# Patient Record
Sex: FEMALE | Race: WHITE | NOT HISPANIC OR LATINO | Employment: OTHER | URBAN - METROPOLITAN AREA
[De-identification: names, ages, dates, MRNs, and addresses within clinical notes are randomized per-mention and may not be internally consistent; named-entity substitution may affect disease eponyms.]

---

## 2017-03-14 DIAGNOSIS — I35.0 AORTIC VALVE STENOSIS, UNSPECIFIED ETIOLOGY: Primary | ICD-10-CM

## 2017-03-14 DIAGNOSIS — Z00.6 EXAMINATION OF PARTICIPANT IN CLINICAL TRIAL: ICD-10-CM

## 2017-04-18 ENCOUNTER — HOSPITAL ENCOUNTER (OUTPATIENT)
Dept: RADIOLOGY | Facility: HOSPITAL | Age: 82
Discharge: HOME | End: 2017-04-18
Attending: INTERNAL MEDICINE
Payer: MEDICARE

## 2017-04-18 ENCOUNTER — RESEARCH ENCOUNTER (OUTPATIENT)
Dept: RESEARCH | Facility: HOSPITAL | Age: 82
End: 2017-04-18

## 2017-04-18 ENCOUNTER — OFFICE VISIT (OUTPATIENT)
Dept: CARDIOLOGY | Facility: CLINIC | Age: 82
End: 2017-04-18
Payer: MEDICARE

## 2017-04-18 ENCOUNTER — HOSPITAL ENCOUNTER (OUTPATIENT)
Dept: CARDIOLOGY | Facility: CLINIC | Age: 82
Discharge: HOME | End: 2017-04-18
Payer: MEDICARE

## 2017-04-18 VITALS
HEIGHT: 59 IN | DIASTOLIC BLOOD PRESSURE: 59 MMHG | HEART RATE: 52 BPM | OXYGEN SATURATION: 98 % | SYSTOLIC BLOOD PRESSURE: 135 MMHG | BODY MASS INDEX: 27.29 KG/M2 | WEIGHT: 135.38 LBS

## 2017-04-18 DIAGNOSIS — I35.0 NONRHEUMATIC AORTIC VALVE STENOSIS: Primary | ICD-10-CM

## 2017-04-18 DIAGNOSIS — I50.32 CHRONIC DIASTOLIC HEART FAILURE: ICD-10-CM

## 2017-04-18 DIAGNOSIS — Z95.2 S/P TAVR (TRANSCATHETER AORTIC VALVE REPLACEMENT): ICD-10-CM

## 2017-04-18 DIAGNOSIS — I35.0 AORTIC VALVE STENOSIS, UNSPECIFIED ETIOLOGY: ICD-10-CM

## 2017-04-18 DIAGNOSIS — Z00.6 EXAMINATION OF PARTICIPANT IN CLINICAL TRIAL: ICD-10-CM

## 2017-04-18 DIAGNOSIS — I25.10 CORONARY ARTERY DISEASE INVOLVING NATIVE CORONARY ARTERY OF NATIVE HEART WITHOUT ANGINA PECTORIS: ICD-10-CM

## 2017-04-18 DIAGNOSIS — I10 ESSENTIAL HYPERTENSION: ICD-10-CM

## 2017-04-18 DIAGNOSIS — I65.23 BILATERAL CAROTID ARTERY STENOSIS: ICD-10-CM

## 2017-04-18 DIAGNOSIS — E78.00 HYPERCHOLESTEROLEMIA: ICD-10-CM

## 2017-04-18 LAB
AORTIC VALVE REGURGITATION: ABNORMAL
DIASTOLIC DYSFUNCTION: YES
ESTIMATED PA SYSTOLIC PRESSURE: 34.01
MITRAL VALVE REGURGITATION: ABNORMAL
RETIRED EF AND QEF - SEE NOTES: 65 (ref 55–65)
TRICUSPID VALVE REGURGITATION: ABNORMAL

## 2017-04-18 PROCEDURE — 99999 PR PBB SHADOW E&M-EST. PATIENT-LVL IV: CPT | Mod: PBBFAC,,, | Performed by: PHYSICIAN ASSISTANT

## 2017-04-18 PROCEDURE — 1126F AMNT PAIN NOTED NONE PRSNT: CPT | Mod: S$GLB,,, | Performed by: PHYSICIAN ASSISTANT

## 2017-04-18 PROCEDURE — 71020 XR CHEST PA AND LATERAL: CPT | Mod: 26,Q1,, | Performed by: RADIOLOGY

## 2017-04-18 PROCEDURE — 3078F DIAST BP <80 MM HG: CPT | Mod: S$GLB,,, | Performed by: PHYSICIAN ASSISTANT

## 2017-04-18 PROCEDURE — 1157F ADVNC CARE PLAN IN RCRD: CPT | Mod: S$GLB,,, | Performed by: PHYSICIAN ASSISTANT

## 2017-04-18 PROCEDURE — 93306 TTE W/DOPPLER COMPLETE: CPT | Mod: PBBFAC | Performed by: INTERNAL MEDICINE

## 2017-04-18 PROCEDURE — 1159F MED LIST DOCD IN RCRD: CPT | Mod: S$GLB,,, | Performed by: PHYSICIAN ASSISTANT

## 2017-04-18 PROCEDURE — 99214 OFFICE O/P EST MOD 30 MIN: CPT | Mod: S$GLB,,, | Performed by: PHYSICIAN ASSISTANT

## 2017-04-18 PROCEDURE — 3075F SYST BP GE 130 - 139MM HG: CPT | Mod: S$GLB,,, | Performed by: PHYSICIAN ASSISTANT

## 2017-04-18 PROCEDURE — 1160F RVW MEDS BY RX/DR IN RCRD: CPT | Mod: S$GLB,,, | Performed by: PHYSICIAN ASSISTANT

## 2017-04-18 PROCEDURE — 71020 XR CHEST PA AND LATERAL: CPT | Mod: TC

## 2017-04-18 RX ORDER — ROSUVASTATIN CALCIUM 10 MG/1
10 TABLET, COATED ORAL DAILY
COMMUNITY

## 2017-04-18 RX ORDER — PROPAFENONE HYDROCHLORIDE 150 MG/1
150 TABLET, COATED ORAL 2 TIMES DAILY
COMMUNITY

## 2017-04-18 NOTE — PROGRESS NOTES
"Subjective:    Patient ID:  Loreta M Damico is a 80 y.o. female who presents for follow-up of TAVR    HPI  Ms Damico presents today for 2 year f/u s/p 20mm Chris S3 TAVR via TF access. She denies CP, PND, orthopnea, or LE edema. She continues to be limited mostly by dizziness. She has seen Dr. Jon and now has a loop recorder implanted, but, per her daughter, she has not had any significant arrhythmias. She continues to follow with Dr. Kelly as well.     NYHA Class II, CCS Class I    Review of Systems   Constitution: Negative for chills, diaphoresis, fever, weakness, weight gain and weight loss.   HENT: Negative for headaches and sore throat.    Eyes: Negative for blurred vision, left eye, right eye and visual disturbance.   Cardiovascular: Negative for chest pain, claudication, dyspnea on exertion, leg swelling, near-syncope, orthopnea, palpitations, paroxysmal nocturnal dyspnea and syncope.   Respiratory: Negative for cough, hemoptysis, shortness of breath, sputum production and wheezing.    Endocrine: Negative for cold intolerance and heat intolerance.   Hematologic/Lymphatic: Negative for adenopathy. Does not bruise/bleed easily.   Skin: Negative for rash.   Musculoskeletal: Negative for falls, muscle weakness and myalgias.   Gastrointestinal: Negative for abdominal pain, change in bowel habit, constipation, diarrhea, melena and nausea.   Genitourinary: Negative for bladder incontinence.   Neurological: Negative for dizziness, focal weakness, light-headedness and numbness.   Psychiatric/Behavioral: Negative for altered mental status.         Vitals:    04/18/17 0956 04/18/17 0958   BP: (!) 140/59 (!) 135/59   BP Location: Right arm Left arm   Patient Position: Sitting Sitting   BP Method: Automatic Automatic   Pulse: (!) 52 (!) 52   SpO2: 98%    Weight: 61.4 kg (135 lb 5.8 oz)    Height: 4' 11" (1.499 m)    Body mass index is 27.34 kg/(m^2).    Objective:    Physical Exam   Constitutional: She is " oriented to person, place, and time. She appears well-developed and well-nourished. No distress.   HENT:   Head: Normocephalic and atraumatic.   Mouth/Throat: Oropharynx is clear and moist.   Eyes: Conjunctivae and EOM are normal. Pupils are equal, round, and reactive to light. No scleral icterus.   Neck: Neck supple. No JVD present. No tracheal deviation present.   Cardiovascular: Normal rate and regular rhythm.  Exam reveals no gallop and no friction rub.    No murmur heard.  Pulmonary/Chest: Effort normal and breath sounds normal. No respiratory distress. She has no wheezes. She has no rales. She exhibits no tenderness.   Abdominal: Soft. Bowel sounds are normal. She exhibits no distension. There is no hepatosplenomegaly. There is no tenderness.   Musculoskeletal: She exhibits no edema or tenderness.   Neurological: She is alert and oriented to person, place, and time.   Skin: Skin is warm and dry. No rash noted. No erythema.   Psychiatric: She has a normal mood and affect. Her behavior is normal.         Assessment:       1. Aortic stenosis - s/p 20mm Chris S3 TAVR via TF access. Doing well. On ASA and Plavix.    2. Carotid artery stenosis, bilateral - asymptomatic. JR 60-79%, LICA non-obstructive   3. Coronary artery disease involving native coronary artery without angina pectoris - s/p CABGx2 (10y ago), s/p ostial RCA POBA on 4/2/15, patent HENSLEY to LAD,  of SVG to RCA, LI in LCx   4. Hypercholesterolemia - stable, on statin   5. Essential hypertension - controlled   6. Examination of participant in clinical trial    7. S/P TAVR (transcatheter aortic valve replacement)    8. Chronic diastolic heart failure - well compensated clinically at this time.      Plan:     F/U in 1 year per PARTNER protocol  Continue ASA indefinitely.   SBEP for life                    ROS  Physical Exam

## 2017-04-18 NOTE — PROGRESS NOTES
Study: Partner II S3U  Sponsor: [employer]  Follow-up Visit:  2 Year  Date of Visit: April 18, 2017    Patient wishes to continue in study: Yes  All study protocol required CRFs completed: Yes    The subject is here for the 2 year follow up visit and accompanied by her daughter.     The subject reports two Adverse Events since her 1 Year Follow Up visit:  In June 2016, she fell during an episode of vertigo but she did not sustain any injuries.    In January 2017, she fell and struck her head due to vertigo and sustained a laceration that required 3 stitches.  She was triaged at [facility] for both incidents.        All questions were answered to her satisfaction.    She is encouraged to contact a research coordinator with any questions or concerns.     The next follow up visit related to this research trial is the 3 Year Visit.    The following tests and procedures are related to research study:  TTE, CXR, and BNP, QOL, NIHSS, mRS

## 2017-05-22 ENCOUNTER — INITIAL CONSULT (OUTPATIENT)
Dept: ELECTROPHYSIOLOGY | Facility: CLINIC | Age: 82
DRG: 641 | End: 2017-05-22
Payer: MEDICARE

## 2017-05-22 ENCOUNTER — HOSPITAL ENCOUNTER (OUTPATIENT)
Dept: CARDIOLOGY | Facility: CLINIC | Age: 82
Discharge: HOME | DRG: 641 | End: 2017-05-22
Payer: MEDICARE

## 2017-05-22 VITALS
SYSTOLIC BLOOD PRESSURE: 116 MMHG | HEART RATE: 127 BPM | WEIGHT: 133.81 LBS | DIASTOLIC BLOOD PRESSURE: 78 MMHG | BODY MASS INDEX: 26.98 KG/M2 | HEIGHT: 59 IN

## 2017-05-22 DIAGNOSIS — I25.10 CORONARY ARTERY DISEASE INVOLVING NATIVE CORONARY ARTERY WITHOUT ANGINA PECTORIS, UNSPECIFIED WHETHER NATIVE OR TRANSPLANTED HEART: ICD-10-CM

## 2017-05-22 DIAGNOSIS — I35.0 NONRHEUMATIC AORTIC VALVE STENOSIS: ICD-10-CM

## 2017-05-22 DIAGNOSIS — I10 ESSENTIAL HYPERTENSION: Primary | ICD-10-CM

## 2017-05-22 DIAGNOSIS — I49.5 SICK SINUS SYNDROME: Primary | ICD-10-CM

## 2017-05-22 DIAGNOSIS — I49.5 SICK SINUS SYNDROME: ICD-10-CM

## 2017-05-22 DIAGNOSIS — Z95.2 S/P TAVR (TRANSCATHETER AORTIC VALVE REPLACEMENT): ICD-10-CM

## 2017-05-22 DIAGNOSIS — E78.00 HYPERCHOLESTEROLEMIA: ICD-10-CM

## 2017-05-22 DIAGNOSIS — I47.19 ATRIAL TACHYCARDIA: ICD-10-CM

## 2017-05-22 PROCEDURE — 3074F SYST BP LT 130 MM HG: CPT | Mod: S$GLB,,, | Performed by: INTERNAL MEDICINE

## 2017-05-22 PROCEDURE — 1160F RVW MEDS BY RX/DR IN RCRD: CPT | Mod: S$GLB,,, | Performed by: INTERNAL MEDICINE

## 2017-05-22 PROCEDURE — 1126F AMNT PAIN NOTED NONE PRSNT: CPT | Mod: S$GLB,,, | Performed by: INTERNAL MEDICINE

## 2017-05-22 PROCEDURE — 99205 OFFICE O/P NEW HI 60 MIN: CPT | Mod: S$GLB,,, | Performed by: INTERNAL MEDICINE

## 2017-05-22 PROCEDURE — 93000 ELECTROCARDIOGRAM COMPLETE: CPT | Mod: S$GLB,,, | Performed by: INTERNAL MEDICINE

## 2017-05-22 PROCEDURE — 99999 PR PBB SHADOW E&M-EST. PATIENT-LVL III: CPT | Mod: PBBFAC,,, | Performed by: INTERNAL MEDICINE

## 2017-05-22 PROCEDURE — 1157F ADVNC CARE PLAN IN RCRD: CPT | Mod: S$GLB,,, | Performed by: INTERNAL MEDICINE

## 2017-05-22 PROCEDURE — 3078F DIAST BP <80 MM HG: CPT | Mod: S$GLB,,, | Performed by: INTERNAL MEDICINE

## 2017-05-22 PROCEDURE — 1159F MED LIST DOCD IN RCRD: CPT | Mod: S$GLB,,, | Performed by: INTERNAL MEDICINE

## 2017-05-22 NOTE — PROGRESS NOTES
IMPLANTABLE DEVICE EDUCATION CHECKLIST    5/23/17 @ 6 AM  REPORT TO CARDIOLOGY WAITING ROOM ON 3RD FLOOR OF HOSPITAL (DO NOT REPORT TO CLINIC)  Directions for Reporting to Cardiology Waiting Area in the Hospital  If you park in the Parking Garage:  Take elevators to the 2nd floor  Walk up ramp and turn right by Gold Elevators  Take elevator to the 3rd floor  Upon exiting the elevator, turn away from the clinic areas  Walk long borja around to front of hospital to area with windows overlooking [facility]  Check in at Reception Desk  OR  If family is dropping you off:  Have them drop you off at the front of the Hospital  (Near the ER, where all the flags are hung).  Take the E elevators to the 3rd floor.  Check in at the Reception Desk in the waiting room.    WASH YOUR CHEST WITH HIBICLENS OR AN ANTIBACTERIAL SOAP (SUCH AS DIAL) ON THE NIGHT BEFORE AND THE MORNING OF YOUR PROCEDURE.    DO NOT EAT OR DRINK ANYTHING AFTER: 12 MN ON THE NIGHT BEFORE YOUR PROCEDURE    MEDICATIONS  Hold lasix on the morning of procedure.   YOU MAY TAKE OTHER USUAL MORNING MEDICATIONS WITH A SIP OF WATER    YOU WILL BE SPENDING THE NIGHT AFTER YOUR PROCEDURE  YOU WILL NEED SOMEONE TO DRIVE YOU HOME THE DAY AFTER YOUR PROCEDURE    YOUR PAIN DURING YOUR PROCEDURE WILL BE MANAGED BY THE ANESTHESIA TEAM    THE ABOVE INSTRUCTIONS WERE GIVEN TO THE PATIENT VERBALLY AND THEY VERBALIZED UNDERSTANDING. THEY DO NOT REQUIRE ANY SPECIAL NEEDS AND DO NOT HAVE ANY LEARNING BARRIERS.     Any need to reschedule or cancel procedures, or any questions regarding your procedures should be addressed directly with the Arrhythmia Department Nurses at the following phone number: [phone]

## 2017-05-22 NOTE — PROGRESS NOTES
Subjective:     HPI    Cardiologist: Jesus Alberto Kelly MD     I had the pleasure of seeing Loreta M Damico in consultation at your request for the evaluation of sick sinus syndrome. She is an 81 year old female with a history of HTN, HLD, severe AS status-post TAVR, CAD status-post CABG and ostial RCA stent, carotid disease status-post stent to the R ICA, and a history of subdural hematoma following a fall roughly 5 years ago, who had an ILR implanted in 6/2016. I reviewed the 5/8/2017 interrogation, which showed multiple pause events, which were consistent with device undersensing. Additionally, episodes classified as AF were most consistent with sinus rhythm with frequent PACs.    Ms. Damico has been having more fast heart rates recently. She was scheduled to have a pacemaker implanted by Dr. Kelly, but due to scheduling issues they have decided to have this done at [facility]. She is on Rythmol  mg bid.    Other recent cardiac studies include an echo performed in 4/2017 which showed an EF of 65%, mild MR, and well-seated TAVR with mild paravalvular AI. A 24-hour Holter monitor performed in 1/2017 showed sinus rhythm with an average HR of 60 bpm (range 47-81 bpm), with 20 episodes of nonsustained AT (longest 6 beats).     I reviewed today's ECG tracing, which shows atrial tachycardia and a ventricular rate of 127 bpm. Notably, she takes her pulse regularly, and has paroxysms this high interspersed with pulses in the 50s bpm range.    Review of Systems   Constitution: Positive for malaise/fatigue. Negative for decreased appetite, weight gain and weight loss.   HENT: Negative for sore throat.    Eyes: Negative for blurred vision.   Cardiovascular: Positive for chest pain and dyspnea on exertion. Negative for irregular heartbeat, leg swelling, near-syncope, orthopnea, palpitations, paroxysmal nocturnal dyspnea and syncope.   Respiratory: Negative for shortness of breath.    Skin: Negative for rash.    Musculoskeletal: Positive for falls. Negative for arthritis.   Gastrointestinal: Negative for abdominal pain.   Neurological: Negative for focal weakness.   Psychiatric/Behavioral: Negative for altered mental status.        Objective:    Physical Exam   Constitutional: She is oriented to person, place, and time. She appears well-developed and well-nourished. No distress.   HENT:   Head: Normocephalic and atraumatic.   Mouth/Throat: Oropharynx is clear and moist.   Eyes: Conjunctivae are normal. Pupils are equal, round, and reactive to light. No scleral icterus.   Neck: Normal range of motion. Neck supple. No JVD present. No thyromegaly present.   Cardiovascular: Regular rhythm, normal heart sounds and intact distal pulses.  Tachycardia present.  Exam reveals no gallop and no friction rub.    No murmur heard.  Pulmonary/Chest: Effort normal and breath sounds normal. No respiratory distress.   Abdominal: Soft. Bowel sounds are normal. She exhibits no distension.   Musculoskeletal: She exhibits no edema.   Neurological: She is alert and oriented to person, place, and time.   Skin: Skin is warm and dry.   Psychiatric: She has a normal mood and affect. Her behavior is normal.         Assessment:       1. Essential hypertension    2. Atrial tachycardia    3. Sick sinus syndrome    4. Coronary artery disease involving native coronary artery without angina pectoris, unspecified whether native or transplanted heart    5. Nonrheumatic aortic valve stenosis    6. Hypercholesterolemia    7. S/P TAVR (transcatheter aortic valve replacement)         Plan:   In summary, Loreta M Damico is an 81 year old female with a history of sick sinus syndrome and symptomatic atrial tachycardia. She is currently in AT at 127 bpm. Given these events are paroxysmal, there are few options for AVN up-titration. We discussed the risks, benefits, indications, and alternatives to pacemaker implantation. Following implant, a beta-blocker will be  started. Should she have recurrent or symptomatic AT down the road, then we will discuss the risks and benefits of EPS and catheter ablation.    Thank you for allowing me to participate in the care of this patient. Please do not hesitate to call me with any questions or concerns.

## 2017-05-23 ENCOUNTER — ANESTHESIA (OUTPATIENT)
Dept: MEDSURG UNIT | Facility: HOSPITAL | Age: 82
DRG: 641 | End: 2017-05-23
Payer: MEDICARE

## 2017-05-23 ENCOUNTER — ANESTHESIA EVENT (OUTPATIENT)
Dept: MEDSURG UNIT | Facility: HOSPITAL | Age: 82
DRG: 641 | End: 2017-05-23
Payer: MEDICARE

## 2017-05-23 ENCOUNTER — SURGERY (OUTPATIENT)
Age: 82
End: 2017-05-23

## 2017-05-23 DIAGNOSIS — I49.5 SSS (SICK SINUS SYNDROME): Primary | ICD-10-CM

## 2017-05-23 DIAGNOSIS — Z95.0 CARDIAC PACEMAKER IN SITU: ICD-10-CM

## 2017-05-23 DIAGNOSIS — Z95.810 AUTOMATIC IMPLANTABLE CARDIAC DEFIBRILLATOR IN SITU: ICD-10-CM

## 2017-05-23 PROCEDURE — 0JPT32Z REMOVAL OF MONITORING DEVICE FROM TRUNK SUBCUTANEOUS TISSUE AND FASCIA, PERCUTANEOUS APPROACH: ICD-10-PCS | Performed by: INTERNAL MEDICINE

## 2017-05-23 PROCEDURE — 02H63JZ INSERTION OF PACEMAKER LEAD INTO RIGHT ATRIUM, PERCUTANEOUS APPROACH: ICD-10-PCS | Performed by: INTERNAL MEDICINE

## 2017-05-23 PROCEDURE — D9220A PRA ANESTHESIA: Mod: ANES,,, | Performed by: ANESTHESIOLOGY

## 2017-05-23 PROCEDURE — 02HK3JZ INSERTION OF PACEMAKER LEAD INTO RIGHT VENTRICLE, PERCUTANEOUS APPROACH: ICD-10-PCS | Performed by: INTERNAL MEDICINE

## 2017-05-23 PROCEDURE — 0JH606Z INSERTION OF PACEMAKER, DUAL CHAMBER INTO CHEST SUBCUTANEOUS TISSUE AND FASCIA, OPEN APPROACH: ICD-10-PCS | Performed by: INTERNAL MEDICINE

## 2017-05-23 PROCEDURE — D9220A PRA ANESTHESIA: Mod: CRNA,,, | Performed by: NURSE ANESTHETIST, CERTIFIED REGISTERED

## 2017-05-23 RX ORDER — LIDOCAINE HCL/PF 100 MG/5ML
SYRINGE (ML) INTRAVENOUS
Status: DISCONTINUED | OUTPATIENT
Start: 2017-05-23 | End: 2017-05-23

## 2017-05-23 RX ORDER — CEFAZOLIN SODIUM 1 G/3ML
INJECTION, POWDER, FOR SOLUTION INTRAMUSCULAR; INTRAVENOUS
Status: DISCONTINUED | OUTPATIENT
Start: 2017-05-23 | End: 2017-05-23

## 2017-05-23 RX ORDER — IODIXANOL 320 MG/ML
INJECTION, SOLUTION INTRAVASCULAR
Status: DISCONTINUED | OUTPATIENT
Start: 2017-05-23 | End: 2017-05-23

## 2017-05-23 RX ORDER — FENTANYL CITRATE 50 UG/ML
INJECTION, SOLUTION INTRAMUSCULAR; INTRAVENOUS
Status: DISCONTINUED | OUTPATIENT
Start: 2017-05-23 | End: 2017-05-23

## 2017-05-23 RX ORDER — SODIUM CHLORIDE 9 MG/ML
INJECTION, SOLUTION INTRAVENOUS CONTINUOUS PRN
Status: DISCONTINUED | OUTPATIENT
Start: 2017-05-23 | End: 2017-05-23

## 2017-05-23 RX ORDER — PROPOFOL 10 MG/ML
VIAL (ML) INTRAVENOUS
Status: DISCONTINUED | OUTPATIENT
Start: 2017-05-23 | End: 2017-05-23

## 2017-05-23 RX ORDER — PROPOFOL 10 MG/ML
VIAL (ML) INTRAVENOUS CONTINUOUS PRN
Status: DISCONTINUED | OUTPATIENT
Start: 2017-05-23 | End: 2017-05-23

## 2017-05-23 RX ORDER — PHENYLEPHRINE HYDROCHLORIDE 10 MG/ML
INJECTION INTRAVENOUS
Status: DISCONTINUED | OUTPATIENT
Start: 2017-05-23 | End: 2017-05-23

## 2017-05-23 RX ADMIN — PHENYLEPHRINE HYDROCHLORIDE 50 MCG: 10 INJECTION INTRAVENOUS at 08:05

## 2017-05-23 RX ADMIN — IODIXANOL 10 ML: 320 INJECTION, SOLUTION INTRAVASCULAR at 08:05

## 2017-05-23 RX ADMIN — CEFAZOLIN 2 G: 1 INJECTION, POWDER, FOR SOLUTION INTRAMUSCULAR; INTRAVENOUS; PARENTERAL at 08:05

## 2017-05-23 RX ADMIN — PROPOFOL 100 MCG/KG/MIN: 10 INJECTION, EMULSION INTRAVENOUS at 07:05

## 2017-05-23 RX ADMIN — SODIUM CHLORIDE: 0.9 INJECTION, SOLUTION INTRAVENOUS at 07:05

## 2017-05-23 RX ADMIN — PROPOFOL 20 MG: 10 INJECTION, EMULSION INTRAVENOUS at 07:05

## 2017-05-23 RX ADMIN — PHENYLEPHRINE HYDROCHLORIDE 100 MCG: 10 INJECTION INTRAVENOUS at 08:05

## 2017-05-23 RX ADMIN — FENTANYL CITRATE 25 MCG: 50 INJECTION, SOLUTION INTRAMUSCULAR; INTRAVENOUS at 07:05

## 2017-05-23 RX ADMIN — SODIUM CHLORIDE, SODIUM GLUCONATE, SODIUM ACETATE, POTASSIUM CHLORIDE, MAGNESIUM CHLORIDE, SODIUM PHOSPHATE, DIBASIC, AND POTASSIUM PHOSPHATE: .53; .5; .37; .037; .03; .012; .00082 INJECTION, SOLUTION INTRAVENOUS at 07:05

## 2017-05-23 RX ADMIN — LIDOCAINE HYDROCHLORIDE 40 MG: 20 INJECTION, SOLUTION INTRAVENOUS at 07:05

## 2017-05-23 NOTE — TRANSFER OF CARE
Anesthesia Transfer of Care Note    Patient: Loreta M Damico    Procedure(s) Performed: Procedure(s) (LRB):  INSERTION-PACEMAKER-DUAL (N/A)    Patient location: PACU    Anesthesia Type: general    Transport from OR: Transported from OR on room air with adequate spontaneous ventilation    Post pain: adequate analgesia    Post assessment: no apparent anesthetic complications    Post vital signs: stable    Level of consciousness: awake    Nausea/Vomiting: no nausea/vomiting    Complications: none    Transfer of care protocol was followed      Last vitals:   Visit Vitals  /76 (BP Location: Left arm, Patient Position: Lying, BP Method: Automatic)   Pulse 106   Temp 36.4 °C (97.6 °F) (Oral)   Resp 18   Ht 5' (1.524 m)   Wt 59.9 kg (132 lb)   SpO2 96%   Breastfeeding? No   BMI 25.78 kg/m²

## 2017-05-23 NOTE — ANESTHESIA PREPROCEDURE EVALUATION
05/23/2017  Loreta M Damico is a 81 y.o., female;  Pre-operative evaluation for Procedure(s) (LRB):  INSERTION-PACEMAKER-DUAL (N/A)    Loreta M Damico is a 81 y.o. female     Patient Active Problem List   Diagnosis    Aortic stenosis    Carotid artery stenosis (JR 60-79%, LICA 16-39%)    CAD (coronary artery disease) s/p CABGx2 (10y ago) s/p multiple PCI x2 (2013 at [facility] and ~7y ago at [facility])    Hypercholesterolemia    Hypertension    recent GI bleed (1/2015) - s/p EGD negative, awaiting colonoscopy    Post PTCA    Aortic valve disorders    Chronic diastolic heart failure    Acute on chronic diastolic heart failure    Postoperative anemia due to acute blood loss    Coronary atherosclerosis of unspecified type of vessel, native or graft    Examination of participant in clinical trial    S/P TAVR (transcatheter aortic valve replacement)    Thalassemia minor    Anemia    Atrial tachycardia    Sick sinus syndrome       Review of patient's allergies indicates:   Allergen Reactions    Codeine Nausea And Vomiting       No current facility-administered medications on file prior to encounter.      Current Outpatient Prescriptions on File Prior to Encounter   Medication Sig Dispense Refill    amlodipine (NORVASC) 2.5 MG tablet Take 2.5 mg by mouth 2 (two) times daily.       aspirin (ECOTRIN) 81 MG EC tablet Take 81 mg by mouth once daily.      clopidogrel (PLAVIX) 75 mg tablet Take 1 tablet (75 mg total) by mouth once daily. 30 tablet 11    cyanocobalamin 1,000 mcg/mL injection 1,000 mcg every 30 days.       folic acid (FOLVITE) 1 MG tablet Take 1 mg by mouth once daily.       furosemide (LASIX) 40 MG tablet Take 40 mg by mouth once daily.       isosorbide mononitrate (IMDUR) 60 MG 24 hr tablet Take 60 mg by mouth once daily.       levothyroxine (SYNTHROID) 50 MCG tablet Take 50 mcg by mouth  once daily.       lisinopril 10 MG tablet Take 10 mg by mouth 2 (two) times daily.       pantoprazole (PROTONIX) 40 MG tablet Take 40 mg by mouth 2 (two) times daily.       potassium chloride (MICRO-K) 10 MEQ CpSR Take 10 mEq by mouth once daily.       PROLIA 60 mg/mL Syrg Inject 60 mg as directed every 6 (six) months.       propafenone (RHTHYMOL) 150 MG Tab Take 150 mg by mouth 2 (two) times daily.      rosuvastatin (CRESTOR) 10 MG tablet Take 10 mg by mouth once daily.      acetaminophen (TYLENOL) 500 MG tablet Take 1,000 mg by mouth as needed for Pain.         Past Surgical History:   Procedure Laterality Date    CARDIAC CATHETERIZATION      has had stents placed    CARDIAC VALVE SURGERY      CHOLECYSTECTOMY      CORONARY ANGIOPLASTY      CORONARY ARTERY BYPASS GRAFT      EYE SURGERY      HYSTERECTOMY      loop recorder placement          Social History     Social History    Marital status:      Spouse name: N/A    Number of children: N/A    Years of education: N/A     Occupational History    Not on file.     Social History Main Topics    Smoking status: Never Smoker    Smokeless tobacco: Never Used    Alcohol use No    Drug use: No    Sexual activity: Not on file     Other Topics Concern    Not on file     Social History Narrative    No narrative on file         Vital Signs Range (Last 24H):  Temp:  [36.4 °C (97.6 °F)]   Pulse:  [106-127]   Resp:  [18]   BP: (109-119)/(71-78)   SpO2:  [96 %]       CBC:   Recent Labs      17   1425   WBC  7.84   RBC  4.88   HGB  11.2*   HCT  34.0*   PLT  234   MCV  70*   MCH  23.0*   MCHC  32.9       CMP:   Recent Labs      17   1425   NA  142   K  4.2   CL  107   CO2  25   BUN  27*   CREATININE  0.8   GLU  82   CALCIUM  9.4       INR  Recent Labs      17   1425   INR  1.0           Diagnostic Studies:      EKD Echo:      ROS/MED HX  General:  Patient has a history of anesthetic complications.  Cardiovascular: She has  hypertension.  She has congestive heart failure.      Pre-op Assessment    I have reviewed the Patient Summary Reports.    I have reviewed the Nursing Notes.   I have reviewed the Medications.     Review of Systems  Anesthesia Hx:  Hx of Anesthetic complications ponv  Denies Personal Hx of Anesthesia complications.   Cardiovascular:   Hypertension CAD   CHF  Functional Capacity low / < 4 METS  Coronary Artery Disease: S/P Percutaneous Coronary Intervention (PCI)   S/P Drug Eluting Stent (DONTE), right coronary , currently on clopidogrel (Plavix). S/P Aorto-Coronary Bypass Graft Surgery (CABG):  Congestive Heart Failure (CHF)  Hypertension    Endocrine:  Thyroid Disease Hypothyroidism  Metabolic Disorders, Hyperlipoproteinemia      Physical Exam   Airway/Jaw/Neck:  Airway Findings: Mouth Opening: Normal Tongue: Normal  General Airway Assessment: Adult  Mallampati: II            Mental Status:  Mental Status Findings:  Cooperative, Alert and Oriented         Anesthesia Plan  Type of Anesthesia, risks & benefits discussed:  Anesthesia Type:  general, MAC  Patient's Preference: same  Intra-op Monitoring Plan: standard ASA monitors  Intra-op Monitoring Plan Comments:   Post Op Pain Control Plan: IV/PO Opiods PRN  Post Op Pain Control Plan Comments:   Induction:   IV  Beta Blocker:  Patient is not currently on a Beta-Blocker (No further documentation required).       Informed Consent: Patient understands risks and agrees with Anesthesia plan.  Questions answered. Anesthesia consent signed with patient.  ASA Score: 3     Day of Surgery Review of History & Physical:    H&P update referred to the surgeon.         Ready For Surgery From Anesthesia Perspective.

## 2017-05-23 NOTE — ANESTHESIA POSTPROCEDURE EVALUATION
Anesthesia Post Evaluation    Patient: Loreta M Damico    Procedure(s) Performed: Procedure(s) (LRB):  INSERTION-PACEMAKER-DUAL (N/A)    Final Anesthesia Type: general  Patient location during evaluation: Cath Lab  Patient participation: Yes- Able to Participate  Level of consciousness: awake and alert  Post-procedure vital signs: reviewed and stable  Pain management: adequate  Airway patency: patent  PONV status at discharge: No PONV  Anesthetic complications: yes  Perioperative Events: corneal abrasion    Cardiovascular status: blood pressure returned to baseline  Respiratory status: unassisted, spontaneous ventilation and room air  Hydration status: euvolemic  Follow-up not needed.        Visit Vitals  /61 (BP Location: Right arm, Patient Position: Lying, BP Method: Automatic)   Pulse 87   Temp 36.4 °C (97.6 °F) (Oral)   Resp 18   Ht 5' (1.524 m)   Wt 59.9 kg (132 lb)   SpO2 (!) 92%   Breastfeeding? No   BMI 25.78 kg/m²       Pain/Ilya Score: Pain Assessment Performed: Yes (5/23/2017 11:06 AM)  Presence of Pain: denies (5/23/2017 11:06 AM)  Pain Rating Prior to Med Admin: 1 (5/23/2017 10:30 AM)  Ilya Score: 10 (5/23/2017 10:45 AM)

## 2017-05-23 NOTE — ANESTHESIA RELEASE NOTE
Anesthesia Release from PACU Note    Patient: Loreta M Damico    Procedure(s) Performed: Procedure(s) (LRB):  INSERTION-PACEMAKER-DUAL (N/A)    Anesthesia type: general    Post pain: Adequate analgesia    Post assessment: no apparent anesthetic complications and tolerated procedure well    Last Vitals:   Visit Vitals  /61 (BP Location: Right arm, Patient Position: Lying, BP Method: Automatic)   Pulse 87   Temp 36.4 °C (97.6 °F) (Oral)   Resp 18   Ht 5' (1.524 m)   Wt 59.9 kg (132 lb)   SpO2 (!) 92%   Breastfeeding? No   BMI 25.78 kg/m²       Post vital signs: stable    Level of consciousness: awake, alert  and oriented    Nausea/Vomiting: no nausea/no vomiting    Complications: none and possible eye injury    Airway Patency: patent    Respiratory: unassisted, spontaneous ventilation, room air    Cardiovascular: stable and blood pressure at baseline    Hydration: euvolemic

## 2017-05-25 ENCOUNTER — NURSE TRIAGE (OUTPATIENT)
Dept: ADMINISTRATIVE | Facility: CLINIC | Age: 82
End: 2017-05-25

## 2017-05-25 ENCOUNTER — HOSPITAL ENCOUNTER (INPATIENT)
Facility: HOSPITAL | Age: 82
LOS: 1 days | Discharge: HOME HEALTH | DRG: 641 | End: 2017-05-26
Attending: EMERGENCY MEDICINE | Admitting: HOSPITALIST
Payer: MEDICARE

## 2017-05-25 DIAGNOSIS — I47.19 ATRIAL TACHYCARDIA: ICD-10-CM

## 2017-05-25 DIAGNOSIS — E86.0 DEHYDRATION: Primary | ICD-10-CM

## 2017-05-25 DIAGNOSIS — I47.19 ATRIAL TACHYCARDIA: Primary | ICD-10-CM

## 2017-05-25 PROBLEM — I95.9 HYPOTENSION: Status: ACTIVE | Noted: 2017-05-25

## 2017-05-25 PROBLEM — N17.9 AKI (ACUTE KIDNEY INJURY): Status: ACTIVE | Noted: 2017-05-25

## 2017-05-25 PROBLEM — I25.810 CORONARY ARTERY DISEASE INVOLVING AUTOLOGOUS VEIN BYPASS GRAFT: Status: ACTIVE | Noted: 2017-05-25

## 2017-05-25 PROBLEM — I48.91 AFIB: Status: ACTIVE | Noted: 2017-05-25

## 2017-05-25 LAB
ALBUMIN SERPL BCP-MCNC: 3.4 G/DL
ALP SERPL-CCNC: 92 U/L
ALT SERPL W/O P-5'-P-CCNC: 6 U/L
ANION GAP SERPL CALC-SCNC: 9 MMOL/L
ANISOCYTOSIS BLD QL SMEAR: SLIGHT
AST SERPL-CCNC: 22 U/L
BASOPHILS # BLD AUTO: ABNORMAL K/UL
BASOPHILS NFR BLD: 0 %
BILIRUB SERPL-MCNC: 0.5 MG/DL
BILIRUB UR QL STRIP: NEGATIVE
BNP SERPL-MCNC: 280 PG/ML
BUN SERPL-MCNC: 40 MG/DL
CALCIUM SERPL-MCNC: 8 MG/DL
CHLORIDE SERPL-SCNC: 110 MMOL/L
CLARITY UR REFRACT.AUTO: CLEAR
CO2 SERPL-SCNC: 20 MMOL/L
COLOR UR AUTO: NORMAL
CREAT SERPL-MCNC: 1.2 MG/DL
CREAT UR-MCNC: 45 MG/DL
DIFFERENTIAL METHOD: ABNORMAL
EOSINOPHIL # BLD AUTO: ABNORMAL K/UL
EOSINOPHIL NFR BLD: 0 %
EOSINOPHIL URNS QL WRIGHT STN: NORMAL
ERYTHROCYTE [DISTWIDTH] IN BLOOD BY AUTOMATED COUNT: 15.6 %
EST. GFR  (AFRICAN AMERICAN): 49.3 ML/MIN/1.73 M^2
EST. GFR  (NON AFRICAN AMERICAN): 42.8 ML/MIN/1.73 M^2
GLUCOSE SERPL-MCNC: 98 MG/DL
GLUCOSE UR QL STRIP: NEGATIVE
HCT VFR BLD AUTO: 30.8 %
HGB BLD-MCNC: 10.2 G/DL
HGB UR QL STRIP: NEGATIVE
HYPOCHROMIA BLD QL SMEAR: ABNORMAL
INR PPP: 1
KETONES UR QL STRIP: NEGATIVE
LEUKOCYTE ESTERASE UR QL STRIP: NEGATIVE
LYMPHOCYTES # BLD AUTO: ABNORMAL K/UL
LYMPHOCYTES NFR BLD: 13 %
MCH RBC QN AUTO: 23.1 PG
MCHC RBC AUTO-ENTMCNC: 33.1 %
MCV RBC AUTO: 70 FL
MONOCYTES # BLD AUTO: ABNORMAL K/UL
MONOCYTES NFR BLD: 5 %
NEUTROPHILS # BLD AUTO: ABNORMAL K/UL
NEUTROPHILS NFR BLD: 82 %
NITRITE UR QL STRIP: NEGATIVE
OVALOCYTES BLD QL SMEAR: ABNORMAL
PH UR STRIP: 6 [PH] (ref 5–8)
PLATELET # BLD AUTO: 171 K/UL
PMV BLD AUTO: ABNORMAL FL
POIKILOCYTOSIS BLD QL SMEAR: SLIGHT
POLYCHROMASIA BLD QL SMEAR: ABNORMAL
POTASSIUM SERPL-SCNC: 4.2 MMOL/L
PROT SERPL-MCNC: 6.5 G/DL
PROT UR QL STRIP: NEGATIVE
PROTHROMBIN TIME: 11.1 SEC
RBC # BLD AUTO: 4.41 M/UL
SCHISTOCYTES BLD QL SMEAR: ABNORMAL
SODIUM SERPL-SCNC: 139 MMOL/L
SODIUM UR-SCNC: 40 MMOL/L
SP GR UR STRIP: 1.01 (ref 1–1.03)
TARGETS BLD QL SMEAR: ABNORMAL
TROPONIN I SERPL DL<=0.01 NG/ML-MCNC: 0.02 NG/ML
TSH SERPL DL<=0.005 MIU/L-ACNC: 2.45 UIU/ML
URN SPEC COLLECT METH UR: NORMAL
UROBILINOGEN UR STRIP-ACNC: NEGATIVE EU/DL
UUN UR-MCNC: 625 MG/DL
WBC # BLD AUTO: 11.53 K/UL

## 2017-05-25 PROCEDURE — 11000001 HC ACUTE MED/SURG PRIVATE ROOM

## 2017-05-25 PROCEDURE — 25000003 PHARM REV CODE 250: Performed by: HOSPITALIST

## 2017-05-25 PROCEDURE — 85610 PROTHROMBIN TIME: CPT

## 2017-05-25 PROCEDURE — 83880 ASSAY OF NATRIURETIC PEPTIDE: CPT

## 2017-05-25 PROCEDURE — 99222 1ST HOSP IP/OBS MODERATE 55: CPT | Mod: ,,, | Performed by: HOSPITALIST

## 2017-05-25 PROCEDURE — 93280 PM DEVICE PROGR EVAL DUAL: CPT

## 2017-05-25 PROCEDURE — 80053 COMPREHEN METABOLIC PANEL: CPT

## 2017-05-25 PROCEDURE — 85027 COMPLETE CBC AUTOMATED: CPT

## 2017-05-25 PROCEDURE — 84443 ASSAY THYROID STIM HORMONE: CPT

## 2017-05-25 PROCEDURE — 82570 ASSAY OF URINE CREATININE: CPT

## 2017-05-25 PROCEDURE — 99285 EMERGENCY DEPT VISIT HI MDM: CPT | Mod: 25

## 2017-05-25 PROCEDURE — 87205 SMEAR GRAM STAIN: CPT

## 2017-05-25 PROCEDURE — 25000003 PHARM REV CODE 250: Performed by: STUDENT IN AN ORGANIZED HEALTH CARE EDUCATION/TRAINING PROGRAM

## 2017-05-25 PROCEDURE — 84484 ASSAY OF TROPONIN QUANT: CPT

## 2017-05-25 PROCEDURE — 84300 ASSAY OF URINE SODIUM: CPT

## 2017-05-25 PROCEDURE — 96361 HYDRATE IV INFUSION ADD-ON: CPT

## 2017-05-25 PROCEDURE — 93005 ELECTROCARDIOGRAM TRACING: CPT

## 2017-05-25 PROCEDURE — 81003 URINALYSIS AUTO W/O SCOPE: CPT

## 2017-05-25 PROCEDURE — 96360 HYDRATION IV INFUSION INIT: CPT

## 2017-05-25 PROCEDURE — 25000003 PHARM REV CODE 250: Performed by: EMERGENCY MEDICINE

## 2017-05-25 PROCEDURE — 99285 EMERGENCY DEPT VISIT HI MDM: CPT | Mod: ,,, | Performed by: EMERGENCY MEDICINE

## 2017-05-25 PROCEDURE — 85007 BL SMEAR W/DIFF WBC COUNT: CPT

## 2017-05-25 PROCEDURE — 93010 ELECTROCARDIOGRAM REPORT: CPT | Mod: ,,, | Performed by: INTERNAL MEDICINE

## 2017-05-25 PROCEDURE — 93280 PM DEVICE PROGR EVAL DUAL: CPT | Mod: 26,,, | Performed by: INTERNAL MEDICINE

## 2017-05-25 PROCEDURE — 84540 ASSAY OF URINE/UREA-N: CPT

## 2017-05-25 RX ORDER — CLOPIDOGREL BISULFATE 75 MG/1
75 TABLET ORAL DAILY
COMMUNITY

## 2017-05-25 RX ORDER — ACETAMINOPHEN 325 MG/1
650 TABLET ORAL EVERY 8 HOURS PRN
Status: DISCONTINUED | OUTPATIENT
Start: 2017-05-25 | End: 2017-05-26 | Stop reason: HOSPADM

## 2017-05-25 RX ORDER — RAMELTEON 8 MG/1
8 TABLET ORAL NIGHTLY PRN
Status: DISCONTINUED | OUTPATIENT
Start: 2017-05-25 | End: 2017-05-26 | Stop reason: HOSPADM

## 2017-05-25 RX ORDER — METOPROLOL TARTRATE 25 MG/1
25 TABLET, FILM COATED ORAL
Status: COMPLETED | OUTPATIENT
Start: 2017-05-25 | End: 2017-05-25

## 2017-05-25 RX ORDER — PROPAFENONE HYDROCHLORIDE 150 MG/1
150 TABLET, COATED ORAL 2 TIMES DAILY
Status: DISCONTINUED | OUTPATIENT
Start: 2017-05-25 | End: 2017-05-26 | Stop reason: HOSPADM

## 2017-05-25 RX ORDER — GLUCAGON 1 MG
1 KIT INJECTION
Status: DISCONTINUED | OUTPATIENT
Start: 2017-05-25 | End: 2017-05-26 | Stop reason: HOSPADM

## 2017-05-25 RX ORDER — SODIUM CHLORIDE 9 MG/ML
INJECTION, SOLUTION INTRAVENOUS CONTINUOUS
Status: ACTIVE | OUTPATIENT
Start: 2017-05-25 | End: 2017-05-26

## 2017-05-25 RX ORDER — LEVOTHYROXINE SODIUM 50 UG/1
50 TABLET ORAL DAILY
Status: DISCONTINUED | OUTPATIENT
Start: 2017-05-25 | End: 2017-05-26 | Stop reason: HOSPADM

## 2017-05-25 RX ORDER — IBUPROFEN 200 MG
16 TABLET ORAL
Status: DISCONTINUED | OUTPATIENT
Start: 2017-05-25 | End: 2017-05-26 | Stop reason: HOSPADM

## 2017-05-25 RX ORDER — CLOPIDOGREL BISULFATE 75 MG/1
75 TABLET ORAL DAILY
Status: DISCONTINUED | OUTPATIENT
Start: 2017-05-25 | End: 2017-05-26 | Stop reason: HOSPADM

## 2017-05-25 RX ORDER — ROSUVASTATIN CALCIUM 10 MG/1
10 TABLET, COATED ORAL DAILY
Status: DISCONTINUED | OUTPATIENT
Start: 2017-05-25 | End: 2017-05-26 | Stop reason: HOSPADM

## 2017-05-25 RX ORDER — INSULIN ASPART 100 [IU]/ML
0-5 INJECTION, SOLUTION INTRAVENOUS; SUBCUTANEOUS
Status: DISCONTINUED | OUTPATIENT
Start: 2017-05-25 | End: 2017-05-26 | Stop reason: HOSPADM

## 2017-05-25 RX ORDER — CEPHALEXIN 500 MG/1
500 CAPSULE ORAL EVERY 8 HOURS
Status: DISCONTINUED | OUTPATIENT
Start: 2017-05-25 | End: 2017-05-26 | Stop reason: HOSPADM

## 2017-05-25 RX ORDER — FOLIC ACID 1 MG/1
1 TABLET ORAL DAILY
Status: DISCONTINUED | OUTPATIENT
Start: 2017-05-25 | End: 2017-05-26 | Stop reason: HOSPADM

## 2017-05-25 RX ORDER — METOPROLOL TARTRATE 25 MG/1
25 TABLET, FILM COATED ORAL 2 TIMES DAILY
Status: DISCONTINUED | OUTPATIENT
Start: 2017-05-25 | End: 2017-05-26 | Stop reason: HOSPADM

## 2017-05-25 RX ORDER — SODIUM CHLORIDE, SODIUM LACTATE, POTASSIUM CHLORIDE, CALCIUM CHLORIDE 600; 310; 30; 20 MG/100ML; MG/100ML; MG/100ML; MG/100ML
250 INJECTION, SOLUTION INTRAVENOUS
Status: COMPLETED | OUTPATIENT
Start: 2017-05-25 | End: 2017-05-25

## 2017-05-25 RX ORDER — ASPIRIN 81 MG/1
81 TABLET ORAL DAILY
Status: DISCONTINUED | OUTPATIENT
Start: 2017-05-25 | End: 2017-05-26 | Stop reason: HOSPADM

## 2017-05-25 RX ORDER — PANTOPRAZOLE SODIUM 40 MG/1
40 TABLET, DELAYED RELEASE ORAL 2 TIMES DAILY
Status: DISCONTINUED | OUTPATIENT
Start: 2017-05-25 | End: 2017-05-26 | Stop reason: HOSPADM

## 2017-05-25 RX ORDER — IBUPROFEN 200 MG
24 TABLET ORAL
Status: DISCONTINUED | OUTPATIENT
Start: 2017-05-25 | End: 2017-05-26 | Stop reason: HOSPADM

## 2017-05-25 RX ADMIN — ASPIRIN 81 MG: 81 TABLET, COATED ORAL at 02:05

## 2017-05-25 RX ADMIN — SODIUM CHLORIDE 1000 ML: 0.9 INJECTION, SOLUTION INTRAVENOUS at 09:05

## 2017-05-25 RX ADMIN — SODIUM CHLORIDE: 0.9 INJECTION, SOLUTION INTRAVENOUS at 10:05

## 2017-05-25 RX ADMIN — SODIUM CHLORIDE: 0.9 INJECTION, SOLUTION INTRAVENOUS at 02:05

## 2017-05-25 RX ADMIN — ROSUVASTATIN CALCIUM 10 MG: 10 TABLET ORAL at 02:05

## 2017-05-25 RX ADMIN — SODIUM CHLORIDE, SODIUM LACTATE, POTASSIUM CHLORIDE, AND CALCIUM CHLORIDE 250 ML: .6; .31; .03; .02 INJECTION, SOLUTION INTRAVENOUS at 10:05

## 2017-05-25 RX ADMIN — METOPROLOL TARTRATE 25 MG: 25 TABLET ORAL at 10:05

## 2017-05-25 RX ADMIN — LEVOTHYROXINE SODIUM 50 MCG: 50 TABLET ORAL at 02:05

## 2017-05-25 RX ADMIN — CLOPIDOGREL 75 MG: 75 TABLET, FILM COATED ORAL at 02:05

## 2017-05-25 RX ADMIN — PROPAFENONE HYDROCHLORIDE 150 MG: 150 TABLET, FILM COATED ORAL at 10:05

## 2017-05-25 RX ADMIN — CEPHALEXIN 500 MG: 500 CAPSULE ORAL at 10:05

## 2017-05-25 RX ADMIN — PANTOPRAZOLE SODIUM 40 MG: 40 TABLET, DELAYED RELEASE ORAL at 10:05

## 2017-05-25 RX ADMIN — FOLIC ACID 1 MG: 1 TABLET ORAL at 02:05

## 2017-05-25 RX ADMIN — CEPHALEXIN 500 MG: 500 CAPSULE ORAL at 02:05

## 2017-05-25 NOTE — CONSULTS
[facility]  Cardiology Service ER Consult Note    Attending Physician: Vishnu Baptiste MD  Reason for Consult: Tachycardia    HPI:     Loreta M Damico is a 81 year old female patient with a PMH of HTN, HL, severe AS s/p TAVR, CAD s/p CABG and ostial RCA stent, carotid disease s/p stenting of right ICA, subdural hematoma, sinus pause detected with ILR s/p dual chamber PPM placement on 5/23/17 and atrial tachycardia detected on ILR. The patient was discharged from the hospital yesterday after receiving her PPM the day before. The patient felt weak and tired when she went home. Her heart rate was in the 130s and BP was as low as 60/40 when they checked with the home BP cuff. She was prescribed metoprolol on discharge for her tachycardia. However, because of a problem with the prescription, she could not take the metoprolol. However she continued with the other meds. She did not feel better this morning. So they came to the ED.    The patient denies any SOB, PND, orthopnea, leg edema, bleeding, fever, chills, cough, dysuria, abdominal pain, chest pain, near syncope or syncope, palpitations. She reports feeling some lightheadedness. No focal neurological deficits.     On presentation to the ED, her SBP was 85 and improved to 105 with 1 liter of normal saline. She reports feeling better compared to the time she came in.    ROS: As given in the HPI.      PMH:     Past Medical History:   Diagnosis Date    Anticoagulant long-term use     Arthritis     Carotid artery occlusion     CHF (congestive heart failure)     Coronary artery disease     Encounter for blood transfusion     Hyperlipidemia     Hypertension     PONV (postoperative nausea and vomiting)     Thyroid disease      Past Surgical History:   Procedure Laterality Date    CARDIAC CATHETERIZATION      has had stents placed    CARDIAC VALVE SURGERY      CHOLECYSTECTOMY      CORONARY ANGIOPLASTY      CORONARY ARTERY BYPASS GRAFT       EYE SURGERY      HYSTERECTOMY      loop recorder placement           Medications:     Current Facility-Administered Medications on File Prior to Encounter   Medication Dose Route Frequency Provider Last Rate Last Dose    [DISCONTINUED] acetaminophen tablet 650 mg  650 mg Oral Q6H PRN Josh Bruce MD   650 mg at 05/24/17 0537    [DISCONTINUED] amlodipine tablet 2.5 mg  2.5 mg Oral BID Josh Bruce MD        [DISCONTINUED] aspirin EC tablet 81 mg  81 mg Oral Daily Josh Bruce MD        [DISCONTINUED] cephALEXin capsule 500 mg  500 mg Oral Q8H Josh Bruce MD   500 mg at 05/24/17 0537    [DISCONTINUED] clopidogrel tablet 75 mg  75 mg Oral Daily Josh Bruce MD        [DISCONTINUED] erythromycin 5 mg/gram (0.5 %) ophthalmic ointment   Left Eye Q8H Liseth Walter MD   1 inch at 05/24/17 0537    [DISCONTINUED] furosemide tablet 40 mg  40 mg Oral Daily Josh Bruce MD   40 mg at 05/23/17 1227    [DISCONTINUED] HYDROmorphone injection 0.2 mg  0.2 mg Intravenous Q5 Min PRN MARY Franklin MD        [DISCONTINUED] isosorbide mononitrate 24 hr tablet 60 mg  60 mg Oral Daily Josh Bruce MD        [DISCONTINUED] levothyroxine tablet 50 mcg  50 mcg Oral Daily Josh Bruce MD        [DISCONTINUED] metoprolol tartrate (LOPRESSOR) tablet 25 mg  25 mg Oral BID Josh Bruce MD   25 mg at 05/23/17 2059    [DISCONTINUED] ondansetron injection 4 mg  4 mg Intravenous Daily PRN MARY Franklin MD        [DISCONTINUED] pantoprazole EC tablet 40 mg  40 mg Oral BID Josh Bruce MD   40 mg at 05/23/17 2100    [DISCONTINUED] propafenone tablet 150 mg  150 mg Oral BID Josh Bruce MD   150 mg at 05/23/17 2058    [DISCONTINUED] rosuvastatin tablet 10 mg  10 mg Oral QHS Josh Bruce MD   10 mg at 05/23/17 2100    [DISCONTINUED] sodium chloride 0.9% flush 3 mL  3 mL Intravenous Q8H MARY Franklin MD   3 mL at 05/24/17 0538    [DISCONTINUED] sodium chloride 0.9% flush  3 mL  3 mL Intravenous PRN T. Misael Franklin MD         Current Outpatient Prescriptions on File Prior to Encounter   Medication Sig Dispense Refill    cephALEXin (KEFLEX) 500 MG capsule Take 1 capsule (500 mg total) by mouth every 8 (eight) hours. 15 capsule 0    metoprolol tartrate (LOPRESSOR) 25 MG tablet Take 1 tablet (25 mg total) by mouth 2 (two) times daily. 60 tablet 3    acetaminophen (TYLENOL) 500 MG tablet Take 1,000 mg by mouth as needed for Pain.      amlodipine (NORVASC) 2.5 MG tablet Take 2.5 mg by mouth 2 (two) times daily.       aspirin (ECOTRIN) 81 MG EC tablet Take 81 mg by mouth once daily.      clopidogrel (PLAVIX) 75 mg tablet Take 1 tablet (75 mg total) by mouth once daily. 30 tablet 11    cyanocobalamin 1,000 mcg/mL injection 1,000 mcg every 30 days.       folic acid (FOLVITE) 1 MG tablet Take 1 mg by mouth once daily.       furosemide (LASIX) 40 MG tablet Take 40 mg by mouth once daily.       isosorbide mononitrate (IMDUR) 60 MG 24 hr tablet Take 60 mg by mouth once daily.       levothyroxine (SYNTHROID) 50 MCG tablet Take 50 mcg by mouth once daily.       lisinopril 10 MG tablet Take 10 mg by mouth 2 (two) times daily.       pantoprazole (PROTONIX) 40 MG tablet Take 40 mg by mouth 2 (two) times daily.       potassium chloride (MICRO-K) 10 MEQ CpSR Take 10 mEq by mouth once daily.       PROLIA 60 mg/mL Syrg Inject 60 mg as directed every 6 (six) months.       propafenone (RHTHYMOL) 150 MG Tab Take 150 mg by mouth 2 (two) times daily.      rosuvastatin (CRESTOR) 10 MG tablet Take 10 mg by mouth once daily.          Physical Exam:     Vitals:  Temp:  [98.1 °F (36.7 °C)]   Pulse:  [135-140]   Resp:  [16-19]   BP: ()/(54-60)   SpO2:  [96 %-100 %]        Physical Exam   Constitutional: She is oriented to person, place, and time. No distress.   HENT:   Head: Normocephalic and atraumatic.   Eyes: No scleral icterus.   Neck: No JVD present.   Cardiovascular: Exam reveals no  friction rub.    No murmur heard.  Tachycardic, mostly regular.   Pulmonary/Chest: She has rales.   Right sided rales.  Left upper chest pacemaker pocket fresh incision clean with no pus, errhythema.  Left chest ILR removal site with no problems   Abdominal: There is no tenderness. There is no rebound.   Musculoskeletal: She exhibits no edema.   Bilateral leg varicose veins   Neurological: She is alert and oriented to person, place, and time.   Skin: Skin is warm and dry. She is not diaphoretic.   Psychiatric: She has a normal mood and affect. Her behavior is normal.         Labs:     Recent Results (from the past 336 hour(s))   CBC auto differential    Collection Time: 05/25/17  8:28 AM   Result Value Ref Range    WBC 11.53 3.90 - 12.70 K/uL    Hemoglobin 10.2 (L) 12.0 - 16.0 g/dL    Hematocrit 30.8 (L) 37.0 - 48.5 %    Platelets 171 150 - 350 K/uL   CBC auto differential    Collection Time: 05/22/17  2:25 PM   Result Value Ref Range    WBC 7.84 3.90 - 12.70 K/uL    Hemoglobin 11.2 (L) 12.0 - 16.0 g/dL    Hematocrit 34.0 (L) 37.0 - 48.5 %    Platelets 234 150 - 350 K/uL       Recent Results (from the past 336 hour(s))   Basic metabolic panel    Collection Time: 05/22/17  2:25 PM   Result Value Ref Range    Sodium 142 136 - 145 mmol/L    Potassium 4.2 3.5 - 5.1 mmol/L    Chloride 107 95 - 110 mmol/L    CO2 25 23 - 29 mmol/L    BUN, Bld 27 (H) 8 - 23 mg/dL    Creatinine 0.8 0.5 - 1.4 mg/dL    Calcium 9.4 8.7 - 10.5 mg/dL    Anion Gap 10 8 - 16 mmol/L         Recent Labs  Lab 05/25/17  0828   TROPONINI 0.022       Imaging:  No infiltrates or effusion    EKG:   Possible atrial flutter with variable AV block    Telemetry:   Telemetry currently shows: Possible atrial flutter.    Assessment & Recommendations:     81 year old female patient with a PMH of HTN, HL, severe AS s/p TAVR, CAD s/p CABG and ostial RCA stent, carotid disease s/p stenting of right ICA, subdural hematoma, sinus pause detected with ILR s/p dual  chamber PPM placement on 5/23/17 and atrial tachycardia detected on ILR. The patient was seen with the following diagnoses:    1) Tachycardia  - Patient with history of atrial tachycardia.  - ECG suggestive or atrial flutter.  - PPM interrogation demonstrates possible atrial flutter with tachycardia cycle length around 220 ms. Mostly regular.   - CHADS-VASC score 4. Has history of subdural hematoma.  - Start metoprolol 25 mg BID for rate control. Can uptitrate the dose based on heart rate and BP.  - Won't initiate anticoagulation for now.  - Continue with propafenone for now.  - Patient will likely need ablation down the road.    2) Hypotension  - Likely due to dehydration and BP meds / lasix and poor oral intake nena-operatively.  - BMP suggestive of pre-renal YOLANDA.  - Bedside echo with no pericardial effusion. IVC small consistent with dehydration.  - BP responding to IVFs. Has received 1 liter.  - Should continue with IV normal saline 100 cc/hr for 12-18 hours.  - Liberalize oral intake.  - Stop lasix, stop amlodipine, can resume lisinopril at 10 mg tomorrow if renal function returns to back to baseline and her BP is > 130 mm Hg.   - If her BP is 110-130 tomorrow, should decrease lisinopril dose to 5 mg.  - If BP is <110 tomorrow, discontinue lisinopril for now.    3) Dehydration / YOLANDA  - Management as in #2.    4) S/P PPM Placement  - Incision looks good.  - Device function normal on my interrogation.  - Continue with keflex 500 mg TID for 4 more days (post-procedure prophylaxis). Patient has not received today's doses yet.    5) H/O CAD  - No sings of active ischemia. ECG with no new ischemic changes. Patient is pain free. Troponin negative.  - Continue with ASA, plavix, statin.  - Can resume isosorbide mononitrate tomorrow.    6) Disposition  - Admit to medicine service for hypotension, dehydration, BP medication adjustment. I would expect one night stay.  - Discussed the plan with the patient and the  daughter.    Discussed with Dr. Justus Church and Dr. Nina.        Thank you for this consult. Further recommendations are pending the attending addendum. Please do not hesitate to page with questions.     Signed:  Arian Mccray MD  Cardiology Fellow, PGY-6  Pager: [phone]  5/25/2017 10:36 AM    Attending Addendum:

## 2017-05-25 NOTE — ASSESSMENT & PLAN NOTE
-Likely due to dehydration and BP meds / lasix and poor oral intake nena-operatively.  -In the setting of severe AS s/p TAVR, pt is pre-load dependent and may lose CO due to A fib with RVR loss of atrial kick.  -As a result of hypotension, labs reveal YOLANDA, likely pre-renal  -Bedside echo by cardiology demonstrated no pericardial effusion, small IVC  -BP improved with IVF, s/p 1L NS.    -Hold home BP meds for 24 hours: Amlodipine, lisinopril and lasix.  -Per Cardiology recs: can resume lisinopril at 10 mg tomorrow if renal function returns to back to baseline and her BP is > 130 mm Hg. If her BP is 110-130 tomorrow, should decrease lisinopril dose to 5 mg. If BP is <110 tomorrow, discontinue lisinopril for now.

## 2017-05-25 NOTE — ED PROVIDER NOTES
Encounter Date: 5/25/2017    SCRIBE #1 NOTE: I, Adenike Mann, am scribing for, and in the presence of, Dr. Baptiste.       History     Chief Complaint   Patient presents with    Tachycardia     Pt had pacemaker placed on Monday for uncontrolled A-fib, 's this AM     Review of patient's allergies indicates:   Allergen Reactions    Codeine Nausea And Vomiting     Time seen by provider: 8:47 AM    This is a 81 y.o. Female with history of HTN, HLD, CAD, thyroid disease, arthritis, CHF, who presents with complaint of palpitations and tachycardia. Pt had a pacemaker placed two days ago for uncontrolled A-fib. She needed beta-blockers, but was unable to have them due to hypotension. She has not yet started the beta-blockers. She states that her heart feels like it is pounding out of her chest. Endorses associated pain at the pacemaker incision site, chest tightness, and nausea. She denies fever, chills, SOB.      The history is provided by the patient.     Past Medical History:   Diagnosis Date    Anticoagulant long-term use     Arthritis     Carotid artery occlusion     CHF (congestive heart failure)     Coronary artery disease     Encounter for blood transfusion     Hyperlipidemia     Hypertension     PONV (postoperative nausea and vomiting)     Thyroid disease      Past Surgical History:   Procedure Laterality Date    CARDIAC CATHETERIZATION      has had stents placed    CARDIAC VALVE SURGERY      CHOLECYSTECTOMY      CORONARY ANGIOPLASTY      CORONARY ARTERY BYPASS GRAFT      EYE SURGERY      HYSTERECTOMY      loop recorder placement        Family History   Problem Relation Age of Onset    Stroke Mother     Heart disease Father     No Known Problems Sister     No Known Problems Brother     No Known Problems Maternal Grandmother     No Known Problems Maternal Grandfather     No Known Problems Paternal Grandmother     No Known Problems Paternal Grandfather     No Known Problems Brother      No Known Problems Maternal Aunt     No Known Problems Maternal Uncle     No Known Problems Paternal Aunt     No Known Problems Paternal Uncle     Anemia Neg Hx     Arrhythmia Neg Hx     Asthma Neg Hx     Clotting disorder Neg Hx     Fainting Neg Hx     Heart attack Neg Hx     Heart failure Neg Hx     Hyperlipidemia Neg Hx     Hypertension Neg Hx     Atrial Septal Defect Neg Hx      Social History   Substance Use Topics    Smoking status: Never Smoker    Smokeless tobacco: Never Used    Alcohol use No     Review of Systems   Constitutional: Negative for chills and fever.   HENT: Negative for sore throat.    Eyes: Negative for visual disturbance.   Respiratory: Positive for chest tightness. Negative for shortness of breath.    Cardiovascular: Positive for palpitations.   Gastrointestinal: Positive for nausea.   Genitourinary: Negative for hematuria.   Musculoskeletal: Negative for neck pain.   Skin: Positive for wound (pacemaker incision site with surrounding pain).   Neurological: Negative for weakness.       Physical Exam     Initial Vitals [05/25/17 0808]   BP Pulse Resp Temp SpO2   100/60 (!) 135 16 98.1 °F (36.7 °C) 98 %     Physical Exam    Vitals reviewed.  Constitutional:   81-year-old  woman in moderate distress   HENT:   Head: Normocephalic and atraumatic.   Anhydrous mucous membranes noted   Eyes: EOM are normal. Pupils are equal, round, and reactive to light.   Neck: No tracheal deviation present.   Cardiovascular:   Significant tachycardia noted with regular rhythm; there is mild bruising noted to the left upper chest consistent with recent pacemaker placement there is no significant erythema or purulent drainage noted   Pulmonary/Chest: No stridor.   Mildly to With clear breath sounds bilaterally   Abdominal: Soft. She exhibits no distension. There is no tenderness.   Musculoskeletal: Normal range of motion. She exhibits no edema.   Neurological: She is alert and oriented to  person, place, and time.   Skin: Skin is warm and dry.   Psychiatric:   Moderately anxious, cooperative with exam         ED Course   Critical Care  Date/Time: 5/25/2017 8:47 AM  Performed by: AMELIA PEREZ  Authorized by: AMELIA PEREZ   Direct patient critical care time: 15 minutes  Additional history critical care time: 5 minutes  Ordering / reviewing critical care time: 10 minutes  Documentation critical care time: 5 minutes  Consulting other physicians critical care time: 10 minutes  Consult with family critical care time: 5 minutes  Total critical care time (exclusive of procedural time) : 50 minutes  Critical care was necessary to treat or prevent imminent or life-threatening deterioration of the following conditions: cardiac failure.  Critical care was time spent personally by me on the following activities: discussions with consultants, evaluation of patient's response to treatment, obtaining history from patient or surrogate, ordering and review of laboratory studies, pulse oximetry, review of old charts, re-evaluation of patient's condition, ordering and review of radiographic studies, ordering and performing treatments and interventions, examination of patient, interpretation of cardiac output measurements and development of treatment plan with patient or surrogate.        Labs Reviewed   CBC W/ AUTO DIFFERENTIAL - Abnormal; Notable for the following:        Result Value    Hemoglobin 10.2 (*)     Hematocrit 30.8 (*)     MCV 70 (*)     MCH 23.1 (*)     RDW 15.6 (*)     Gran% 82.0 (*)     Lymph% 13.0 (*)     All other components within normal limits   COMPREHENSIVE METABOLIC PANEL - Abnormal; Notable for the following:     CO2 20 (*)     BUN, Bld 40 (*)     Calcium 8.0 (*)     Albumin 3.4 (*)     ALT 6 (*)     eGFR if  49.3 (*)     eGFR if non  42.8 (*)     All other components within normal limits   B-TYPE NATRIURETIC PEPTIDE - Abnormal; Notable for the  following:      (*)     All other components within normal limits   TROPONIN I   PROTIME-INR   TSH        Imaging Results          X-Ray Chest AP Portable (Final result)  Result time 05/25/17 09:28:17    Final result by Martell Wood MD (05/25/17 09:28:17)                 Impression:     Status post cardiac surgery. No acute process.      Electronically signed by: MARTELL WOOD MD  Date:     05/25/17  Time:    09:28              Narrative:    Portable AP view of the chest was obtained.  Comparison --  5/23. The postoperative findings are again seen consistent with pacemaker placement, sternotomy, and aortic valve stent. Heart size is normal. Mediastinum shows aortic atherosclerosis. Lungs are expanded and clear. No lung consolidation or pleural fluid is seen.                                 Medical Decision Making:   History:   Old Medical Records: I decided to obtain old medical records.  Clinical Tests:   Lab Tests: Ordered and Reviewed  Radiological Study: Reviewed and Ordered  Medical Tests: Ordered and Reviewed            Scribe Attestation:   Scribe #1: I performed the above scribed service and the documentation accurately describes the services I performed. I attest to the accuracy of the note.    Attending Attestation:           Physician Attestation for Scribe:  Physician Attestation Statement for Scribe #1: I, Dr. Baptiste, reviewed documentation, as scribed by Adenike Mann in my presence, and it is both accurate and complete.         Attending ED Notes:   The patient presents with moderate to severe tachycardia without associated chest pain.  Laboratory evaluation reveals moderate volume contraction without significant renal injury.  The patient has undergone urgent evaluation by cardiology on call who believes that the patient's presenting complaints are related to hypovolemia and volume contraction rather than an acute cardiac event.  They have recommended volume resuscitation and  inpatient management.  The patient has undergone fluid resuscitation with 1.5 L of crystalloid, and per recommendations of cardiology has been administered 25 mg of metoprolol by mouth.  She will be admitted to the internal medicine service in fair condition for further therapy and management.          ED Course     Clinical Impression:   The primary encounter diagnosis was Dehydration. A diagnosis of Atrial tachycardia was also pertinent to this visit.    Disposition:   Disposition: Admitted  Condition: Fair  IM       Vishnu Baptiste MD  05/25/17 6929

## 2017-05-25 NOTE — SUBJECTIVE & OBJECTIVE
Past Medical History:   Diagnosis Date    Afib 5/25/2017    Anticoagulant long-term use     Arthritis     Carotid artery occlusion     CHF (congestive heart failure)     Coronary artery disease     Encounter for blood transfusion     Hyperlipidemia     Hypertension     PONV (postoperative nausea and vomiting)     Thyroid disease        Past Surgical History:   Procedure Laterality Date    CARDIAC CATHETERIZATION      has had stents placed    CARDIAC VALVE SURGERY      CHOLECYSTECTOMY      CORONARY ANGIOPLASTY      CORONARY ARTERY BYPASS GRAFT      EYE SURGERY      HYSTERECTOMY      loop recorder placement          Review of patient's allergies indicates:   Allergen Reactions    Codeine Nausea And Vomiting       No current facility-administered medications on file prior to encounter.      Current Outpatient Prescriptions on File Prior to Encounter   Medication Sig    cephALEXin (KEFLEX) 500 MG capsule Take 1 capsule (500 mg total) by mouth every 8 (eight) hours.    metoprolol tartrate (LOPRESSOR) 25 MG tablet Take 1 tablet (25 mg total) by mouth 2 (two) times daily.    acetaminophen (TYLENOL) 500 MG tablet Take 1,000 mg by mouth as needed for Pain.    amlodipine (NORVASC) 2.5 MG tablet Take 2.5 mg by mouth 2 (two) times daily.     aspirin (ECOTRIN) 81 MG EC tablet Take 81 mg by mouth once daily.    clopidogrel (PLAVIX) 75 mg tablet Take 1 tablet (75 mg total) by mouth once daily.    cyanocobalamin 1,000 mcg/mL injection 1,000 mcg every 30 days.     folic acid (FOLVITE) 1 MG tablet Take 1 mg by mouth once daily.     furosemide (LASIX) 40 MG tablet Take 40 mg by mouth once daily.     isosorbide mononitrate (IMDUR) 60 MG 24 hr tablet Take 60 mg by mouth once daily.     levothyroxine (SYNTHROID) 50 MCG tablet Take 50 mcg by mouth once daily.     lisinopril 10 MG tablet Take 10 mg by mouth 2 (two) times daily.     pantoprazole (PROTONIX) 40 MG tablet Take 40 mg by mouth 2 (two) times  daily.     potassium chloride (MICRO-K) 10 MEQ CpSR Take 10 mEq by mouth once daily.     PROLIA 60 mg/mL Syrg Inject 60 mg as directed every 6 (six) months.     propafenone (RHTHYMOL) 150 MG Tab Take 150 mg by mouth 2 (two) times daily.    rosuvastatin (CRESTOR) 10 MG tablet Take 10 mg by mouth once daily.     Family History     Problem Relation (Age of Onset)    Heart disease Father    No Known Problems Sister, Brother, Maternal Grandmother, Maternal Grandfather, Paternal Grandmother, Paternal Grandfather, Brother, Maternal Aunt, Maternal Uncle, Paternal Aunt, Paternal Uncle    Stroke Mother        Social History Main Topics    Smoking status: Never Smoker    Smokeless tobacco: Never Used    Alcohol use No    Drug use: No    Sexual activity: Not on file     Review of Systems   Constitutional: Negative for chills and fever.   HENT: Negative for sneezing and sore throat.    Eyes: Negative for pain and visual disturbance.   Respiratory: Negative for cough, shortness of breath and wheezing.    Cardiovascular: Positive for palpitations. Negative for chest pain and leg swelling.   Gastrointestinal: Negative for abdominal pain, constipation, diarrhea and nausea.   Genitourinary: Negative for dysuria, frequency and urgency.   Allergic/Immunologic: Negative for environmental allergies.   Neurological: Positive for light-headedness. Negative for dizziness, numbness and headaches.   Psychiatric/Behavioral: Negative for agitation. The patient is not nervous/anxious.      Objective:     Vital Signs (Most Recent):  Temp: 98.9 °F (37.2 °C) (05/25/17 1203)  Pulse: 109 (05/25/17 1203)  Resp: 18 (05/25/17 1203)  BP: 108/79 (05/25/17 1203)  SpO2: 100 % (05/25/17 1203) Vital Signs (24h Range):  Temp:  [98.1 °F (36.7 °C)-98.9 °F (37.2 °C)] 98.9 °F (37.2 °C)  Pulse:  [109-140] 109  Resp:  [16-21] 18  SpO2:  [96 %-100 %] 100 %  BP: ()/(54-79) 108/79     Weight: 61.2 kg (135 lb)  Body mass index is 26.37 kg/m².    Physical  Exam   Constitutional: She is oriented to person, place, and time. She appears well-developed and well-nourished. No distress.   HENT:   Head: Normocephalic and atraumatic.   Nose: Nose normal.   Eyes: EOM are normal. No scleral icterus.   Neck: Normal range of motion. No tracheal deviation present.   Cardiovascular: Normal rate.  Exam reveals no gallop and no friction rub.    No murmur heard.  Irregular rhythm   Pulmonary/Chest: Effort normal. No respiratory distress. She has no wheezes.   Crackles audible in the middle and lower R lung field as well as lower L lung field   Abdominal: Soft. Bowel sounds are normal. There is no tenderness.   Musculoskeletal: She exhibits no edema.   Neurological: She is alert and oriented to person, place, and time.   Skin: Skin is warm and dry.        Significant Labs:   CBC:   Recent Labs  Lab 05/25/17  0828   WBC 11.53   HGB 10.2*   HCT 30.8*        CMP:   Recent Labs  Lab 05/25/17  0828      K 4.2      CO2 20*   GLU 98   BUN 40*   CREATININE 1.2   CALCIUM 8.0*   PROT 6.5   ALBUMIN 3.4*   BILITOT 0.5   ALKPHOS 92   AST 22   ALT 6*   ANIONGAP 9   EGFRNONAA 42.8*       Significant Imaging: I have reviewed all pertinent imaging results/findings within the past 24 hours.

## 2017-05-25 NOTE — ED TRIAGE NOTES
Pt arrived with c/o chest tightness and palpitations. Pt had pacemaker placed on 5/23. Pt states all night it felt like her heart was pounding and she was experiencing chest tightness to left upper chest. Pt also c/o pain when taking a deep breath. Pt c/o 6/10 chest tightness

## 2017-05-25 NOTE — H&P
[facility] Hospital Medicine  History & Physical    Patient Name: Loreta M Damico  MRN: [id number]  Admission Date: 5/25/2017  Attending Physician: Kenyatta Mcdowell MD   Primary Care Provider: Javan Castillo MD    [facility] Medicine Team: [facility]OS[facility] MED 5 Asia Davila MD     Patient information was obtained from patient, past medical records and ER records.     Subjective:     Principal Problem:Dehydration    Chief Complaint:   Chief Complaint   Patient presents with    Tachycardia     Pt had pacemaker placed on Monday for uncontrolled A-fib, 's this AM        HPI: 80 yo F PMH of HTN, HLD, severe AS s/p TAVR, CAD s/p CABG and ostial RCA stent, carotid disease s/p stenting of right ICA, subdural hematoma, sinus pause detected with ILR s/p dual chamber PPM placement on 5/23/17 and atrial tachycardia detected on ILR. She was discharged from the hospital yesterday after receiving her PPM the day before.  Since then she felt weak and tired when she went home. Her heart rate was in the 130s and BP was as low as 60/40 when checked with the home BP cuff. She was prescribed metoprolol on discharge for her tachycardia. However, because of a problem with the prescription, she could not take the metoprolol. However she continued with the other meds. She did not feel better this morning. So they came to the ED.  She denies any SOB, PND, orthopnea, leg edema, bleeding, fever, chills, cough, dysuria, abdominal pain, chest pain, near syncope or syncope. She reports feeling some lightheadedness and palpitations. No focal neurological deficits.  On presentation to the ED, her SBP was 85 and improved to 105 with 1 liter of normal saline. She reports feeling better compared to the time she came in.    Past Medical History:   Diagnosis Date    Afib 5/25/2017    Anticoagulant long-term use     Arthritis     Carotid artery occlusion     CHF (congestive heart failure)     Coronary artery disease      Encounter for blood transfusion     Hyperlipidemia     Hypertension     PONV (postoperative nausea and vomiting)     Thyroid disease        Past Surgical History:   Procedure Laterality Date    CARDIAC CATHETERIZATION      has had stents placed    CARDIAC VALVE SURGERY      CHOLECYSTECTOMY      CORONARY ANGIOPLASTY      CORONARY ARTERY BYPASS GRAFT      EYE SURGERY      HYSTERECTOMY      loop recorder placement          Review of patient's allergies indicates:   Allergen Reactions    Codeine Nausea And Vomiting       No current facility-administered medications on file prior to encounter.      Current Outpatient Prescriptions on File Prior to Encounter   Medication Sig    cephALEXin (KEFLEX) 500 MG capsule Take 1 capsule (500 mg total) by mouth every 8 (eight) hours.    metoprolol tartrate (LOPRESSOR) 25 MG tablet Take 1 tablet (25 mg total) by mouth 2 (two) times daily.    acetaminophen (TYLENOL) 500 MG tablet Take 1,000 mg by mouth as needed for Pain.    amlodipine (NORVASC) 2.5 MG tablet Take 2.5 mg by mouth 2 (two) times daily.     aspirin (ECOTRIN) 81 MG EC tablet Take 81 mg by mouth once daily.    clopidogrel (PLAVIX) 75 mg tablet Take 1 tablet (75 mg total) by mouth once daily.    cyanocobalamin 1,000 mcg/mL injection 1,000 mcg every 30 days.     folic acid (FOLVITE) 1 MG tablet Take 1 mg by mouth once daily.     furosemide (LASIX) 40 MG tablet Take 40 mg by mouth once daily.     isosorbide mononitrate (IMDUR) 60 MG 24 hr tablet Take 60 mg by mouth once daily.     levothyroxine (SYNTHROID) 50 MCG tablet Take 50 mcg by mouth once daily.     lisinopril 10 MG tablet Take 10 mg by mouth 2 (two) times daily.     pantoprazole (PROTONIX) 40 MG tablet Take 40 mg by mouth 2 (two) times daily.     potassium chloride (MICRO-K) 10 MEQ CpSR Take 10 mEq by mouth once daily.     PROLIA 60 mg/mL Syrg Inject 60 mg as directed every 6 (six) months.     propafenone (RHTHYMOL) 150 MG Tab Take 150 mg  by mouth 2 (two) times daily.    rosuvastatin (CRESTOR) 10 MG tablet Take 10 mg by mouth once daily.     Family History     Problem Relation (Age of Onset)    Heart disease Father    No Known Problems Sister, Brother, Maternal Grandmother, Maternal Grandfather, Paternal Grandmother, Paternal Grandfather, Brother, Maternal Aunt, Maternal Uncle, Paternal Aunt, Paternal Uncle    Stroke Mother        Social History Main Topics    Smoking status: Never Smoker    Smokeless tobacco: Never Used    Alcohol use No    Drug use: No    Sexual activity: Not on file     Review of Systems   Constitutional: Negative for chills and fever.   HENT: Negative for sneezing and sore throat.    Eyes: Negative for pain and visual disturbance.   Respiratory: Negative for cough, shortness of breath and wheezing.    Cardiovascular: Positive for palpitations. Negative for chest pain and leg swelling.   Gastrointestinal: Negative for abdominal pain, constipation, diarrhea and nausea.   Genitourinary: Negative for dysuria, frequency and urgency.   Allergic/Immunologic: Negative for environmental allergies.   Neurological: Positive for light-headedness. Negative for dizziness, numbness and headaches.   Psychiatric/Behavioral: Negative for agitation. The patient is not nervous/anxious.      Objective:     Vital Signs (Most Recent):  Temp: 98.9 °F (37.2 °C) (05/25/17 1203)  Pulse: 109 (05/25/17 1203)  Resp: 18 (05/25/17 1203)  BP: 108/79 (05/25/17 1203)  SpO2: 100 % (05/25/17 1203) Vital Signs (24h Range):  Temp:  [98.1 °F (36.7 °C)-98.9 °F (37.2 °C)] 98.9 °F (37.2 °C)  Pulse:  [109-140] 109  Resp:  [16-21] 18  SpO2:  [96 %-100 %] 100 %  BP: ()/(54-79) 108/79     Weight: 61.2 kg (135 lb)  Body mass index is 26.37 kg/m².    Physical Exam   Constitutional: She is oriented to person, place, and time. She appears well-developed and well-nourished. No distress.   HENT:   Head: Normocephalic and atraumatic.   Nose: Nose normal.   Eyes: EOM are  normal. No scleral icterus.   Neck: Normal range of motion. No tracheal deviation present.   Cardiovascular: Normal rate.  Exam reveals no gallop and no friction rub.    No murmur heard.  Irregular rhythm   Pulmonary/Chest: Effort normal. No respiratory distress. She has no wheezes.   Crackles audible in the middle and lower R lung field as well as lower L lung field   Abdominal: Soft. Bowel sounds are normal. There is no tenderness.   Musculoskeletal: She exhibits no edema.   Neurological: She is alert and oriented to person, place, and time.   Skin: Skin is warm and dry.        Significant Labs:   CBC:   Recent Labs  Lab 05/25/17  0828   WBC 11.53   HGB 10.2*   HCT 30.8*        CMP:   Recent Labs  Lab 05/25/17 0828      K 4.2      CO2 20*   GLU 98   BUN 40*   CREATININE 1.2   CALCIUM 8.0*   PROT 6.5   ALBUMIN 3.4*   BILITOT 0.5   ALKPHOS 92   AST 22   ALT 6*   ANIONGAP 9   EGFRNONAA 42.8*       Significant Imaging: I have reviewed all pertinent imaging results/findings within the past 24 hours.    Assessment/Plan:     Hypotension    -Likely due to dehydration and BP meds / lasix and poor oral intake nena-operatively.  -In the setting of severe AS s/p TAVR, pt is pre-load dependent and may lose CO due to A fib with RVR loss of atrial kick.  -As a result of hypotension, labs reveal YOLANDA, likely pre-renal  -Bedside echo by cardiology demonstrated no pericardial effusion, small IVC  -BP improved with IVF, s/p 1L NS.    -Hold home BP meds for 24 hours: Amlodipine, lisinopril and lasix.  -Per Cardiology recs: can resume lisinopril at 10 mg tomorrow if renal function returns to back to baseline and her BP is > 130 mm Hg. If her BP is 110-130 tomorrow, should decrease lisinopril dose to 5 mg. If BP is <110 tomorrow, discontinue lisinopril for now.          YOLANDA (acute kidney injury)      -Seen on admit labs  -FeUrea pending, FENA labs pending  -Likely related to dehydration and hypotension +/-  medications  -Strict I/O  -Avoid nephrotoxic agents  -Started fluid infusion 5/25.        Atrial tachycardia    --S/p pacemaker placement 5/23 for sick sinus syndrome and atrial tachycardia   --Continue Keflex 500mg TID for 4 more days  --Evaluated by Cards in the ED:  --ECG suggestive of atrial flutter.  --PPM interrogation demonstrates possible atrial flutter with tachycardia cycle length around 220 ms. Mostly regular.   --CHADS-VASC score 4. Has history of subdural hematoma. Won't initiate anticoagulation for now.  --metoprolol 25 mg BID for rate control. Can uptitrate the dose based on heart rate and BP.  --Continue with propafenone for now.  --Patient may need ablation down the road given she had recurrent/symptomatic AT        CAD (coronary artery disease)    -EKG reveals no ST changes  -Continue home ASA, plavix and statin  -Holding isosorbide for hypotension.  Can resume 5/26 per cards recs.          VTE Risk Mitigation         Ordered     Medium Risk of VTE  Once      05/25/17 1248     Place CHARO hose  Until discontinued      05/25/17 1248     Place sequential compression device  Until discontinued      05/25/17 1248     Place CHARO hose  Until discontinued      05/25/17 1248        Asia Davila MD  Department of Hospital Medicine   [facility]

## 2017-05-25 NOTE — ASSESSMENT & PLAN NOTE
-Seen on admit labs  -FeUrea pending, FENA labs pending  -Likely related to dehydration and hypotension +/- medications  -Strict I/O  -Avoid nephrotoxic agents  -Started fluid infusion 5/25.

## 2017-05-25 NOTE — TELEPHONE ENCOUNTER
"She was released on yesterday after having pacemaker placed. Last b/p 67/40 30 mn ago 74/46 20 min ago.    Reason for Disposition   Sounds like a life-threatening emergency to the triager    Answer Assessment - Initial Assessment Questions  1. DESCRIPTION: "Please describe your heart rate or heart beat that you are having" (e.g., fast/slow, regular/irregular, skipped or extra beats, "palpitations")      Fast 142-145 can feel strong heavy beating   2. ONSET: "When did it start?" (Minutes, hours or days)       30 min ago  3. DURATION: "How long does it last" (e.g., seconds, minutes, hours)      Constant and states felt during night  4. PATTERN "Does it come and go, or has it been constant since it started?"  "Does it get worse with exertion?"   "Are you feeling it now?"      constant  5. TAP: "Using your hand, can you tap out what you are feeling on a chair or table in front of you, so that I can hear?" (Note: not all patients can do this)        With b/p machine  6. HEART RATE: "Can you tell me your heart rate?" "How many beats in 15 seconds?"  (Note: not all patients can do this)        142-145  7. RECURRENT SYMPTOM: "Have you ever had this before?" If so, ask: "When was the last time?" and "What happened that time?"       Yes before pacemaker  8. CAUSE: "What do you think is causing the palpitations?"      Had a pacemaker placed on yesterday  9. CARDIAC HISTORY: "Do you have any history of heart disease?" (e.g., heart attack, angina, bypass surgery, angioplasty, arrhythmia)       Artificial valve and history of bypass and stent  10. OTHER SYMPTOMS: "Do you have any other symptoms?" (e.g., dizziness, chest pain, sweating, difficulty breathing)      Feels tired, little lightheadedness but not a moment  11. PREGNANCY: "Is there any chance you are pregnant?" "When was your last menstrual period?"      Not applicable    Protocols used: ST HEART RATE AND HEART BEAT [id number]      "

## 2017-05-25 NOTE — HPI
82 yo F PMH of HTN, HLD, severe AS s/p TAVR, CAD s/p CABG and ostial RCA stent, carotid disease s/p stenting of right ICA, subdural hematoma, sinus pause detected with ILR s/p dual chamber PPM placement on 5/23/17 and atrial tachycardia detected on ILR. She was discharged from the hospital yesterday after receiving her PPM the day before.  Since then she felt weak and tired when she went home. Her heart rate was in the 130s and BP was as low as 60/40 when checked with the home BP cuff. She was prescribed metoprolol on discharge for her tachycardia. However, because of a problem with the prescription, she could not take the metoprolol. However she continued with the other meds. She did not feel better this morning. So they came to the ED.  She denies any SOB, PND, orthopnea, leg edema, bleeding, fever, chills, cough, dysuria, abdominal pain, chest pain, near syncope or syncope. She reports feeling some lightheadedness and palpitations. No focal neurological deficits.  On presentation to the ED, her SBP was 85 and improved to 105 with 1 liter of normal saline. She reports feeling better compared to the time she came in.

## 2017-05-25 NOTE — ED NOTES
LOC: The patient is awake, alert and aware of environment with an appropriate affect, the patient is oriented x 3 and speaking appropriately.  APPEARANCE: Patient resting comfortably and in no acute distress, patient is clean and well groomed, patient's clothing is properly fastened.  SKIN: The skin is warm and dry, patient has normal skin turgor and moist mucus membranes; steri-strips noted to left upper chest; no redness or drainage noted to incision site; bruising noted to incision site   MUSKULOSKELETAL: Patient moving all extremities well, no obvious swelling or deformities noted.  RESPIRATORY: Airway is open and patent, respirations are spontaneous, patient has a normal effort and rate; pain with taking a deep breath  CARDIAC: Chest tightness noted; tachycardic with tam of 138  ABDOMEN: Soft and non tender to palpation, no distention noted. Bowel sounds present.

## 2017-05-25 NOTE — ASSESSMENT & PLAN NOTE
-EKG reveals no ST changes  -Continue home ASA, plavix and statin  -Holding isosorbide for hypotension.  Can resume 5/26 per cards recs.

## 2017-05-25 NOTE — PLAN OF CARE
Resident Note:    Ms Garibay is an 82 yo female with HTN, HLD, Severe AS s/p TAVR, CAD s/p CABG, subdural hematoma, dual chamber PPM (placed 5/23/17) for sinus pause and history of atrial tachycardia on propafenone who presents to the [facility] ED with palpitations and tachycardia. She was recently admitted for uncontrolled A fib and needed to be started on rate control with beta-blockers but could not tolerate this intervention due to hypotension. In the ED, she was seen by Cardiology who conducted a device interrogation. Pt noted to have A fib/flutter which resolved with IVF. Her hypotension also resolved with IVF administration.        Tachycardia  -Narrow complex  -EKG suggestive of fib/flutter with history of fib  -[id number] - VASc score:  Congestive Heart Failure or EF < 35% NO   Hypertension NO   Age >= 75 YES  +2   Diabetes Mellitus NO   Stroke/TIA prior history YES  +2   Vascular disease (PAD, MI or Aortic Plaque)? YES  +1   Age 64 - 74 NO   Female YES  +1   Total 6     Annual Stroke Risk:  [id number] Score Stroke Risk %   6 9.8   -Not on anticoagulation due to subdural hematoma  -Seen by cards in the ED, appreciate assistance  -Start metoprolol 25mg BID for rate control, hold for SBP<110  -Continue propafenone    Hypotension  -In the setting of severe AS, pt is pre-load dependent and may lose CO due to A fib with RVR loss of atrial kick.  -BMP shows mild YOLANDA  -Bedside echo demonstrated small IVC  -BP improved with IVF, s/p 1L NS  -Hold home BP meds for 24 hours: Amlodipine, lisinopril and lasix.  -Per Cardiology recs: can resume lisinopril at 10 mg tomorrow if renal function returns to back to baseline and her BP is > 130 mm Hg. If her BP is 110-130 tomorrow, should decrease lisinopril dose to 5 mg. If BP is <110 tomorrow, discontinue lisinopril for now.     YOLANDA  -Seen on admit labs  -FeUrea pending  -Likely related to dehydration and hypotension +/- medications  -Strict I/O  -Avoid nephrotoxic agents    S/P PPM  placement   -Evaluated by Cards in the ED.  -Continue Keflex 500mg TID for 4 days     CAD  -Continue home ASA, plavix and statin  -Holding isosorbide for hypotension    For further details, please see formal H&P to follow    Rea Hewitt M.D.   PGY-3  [id number]

## 2017-05-25 NOTE — ASSESSMENT & PLAN NOTE
--S/p pacemaker placement 5/23 for sick sinus syndrome and atrial tachycardia   --Continue Keflex 500mg TID for 4 more days  --Evaluated by Cards in the ED:  --ECG suggestive of atrial flutter.  --PPM interrogation demonstrates possible atrial flutter with tachycardia cycle length around 220 ms. Mostly regular.   --CHADS-VASC score 4. Has history of subdural hematoma. Won't initiate anticoagulation for now.  --metoprolol 25 mg BID for rate control. Can uptitrate the dose based on heart rate and BP.  --Continue with propafenone for now.  --Patient may need ablation down the road given she had recurrent/symptomatic AT

## 2017-05-25 NOTE — MEDICAL/APP STUDENT
Progress Note  Hospital Medicine    Patient Name: Loreta M Damico  YOB: 1935    Admit Date: 5/25/2017 (Hospital Day: 1)    SUBJECTIVE:     Reason for Admission:  Dehydration  See H&P for detailed presentating history and ROS.      Hospital Course: ***    Interval history: ***      OBJECTIVE:     Vital Signs Range (Last 24H):  Temp:  [98.1 °F (36.7 °C)-98.9 °F (37.2 °C)]   Pulse:  []   Resp:  [16-21]   BP: ()/(54-79)   SpO2:  [96 %-100 %] Body mass index is 26.37 kg/m².  Wt Readings from Last 1 Encounters:   05/25/17 0808 61.2 kg (135 lb)       I & O (Last 24H):No intake or output data in the 24 hours ending 05/25/17 1436    Physical Exam:  {Exam:[id number]}    Diagnostic Results:  Lab Results   Component Value Date    WBC 11.53 05/25/2017    HGB 10.2 (L) 05/25/2017    HCT 30.8 (L) 05/25/2017    MCV 70 (L) 05/25/2017     05/25/2017       Recent Labs  Lab 05/25/17  0828   GLU 98      K 4.2      CO2 20*   BUN 40*   CREATININE 1.2   CALCIUM 8.0*     Lab Results   Component Value Date    INR 1.0 05/25/2017    INR 1.0 05/22/2017    INR 1.0 05/13/2015     No results found for: HGBA1C  No results for input(s): POCTGLUCOSE in the last 72 hours.    ASSESSMENT/PLAN:     Active Hospital Problems    Diagnosis  POA    *Dehydration [E86.0]  Yes    Afib [I48.91]  Yes    Coronary artery disease involving autologous vein bypass graft [I25.810]  Yes    YOLANDA (acute kidney injury) [N17.9]  Yes    Hypotension [I95.9]  Unknown    Sick sinus syndrome [I49.5]  Yes    Atrial tachycardia [I47.1]  Yes    S/P TAVR (transcatheter aortic valve replacement) [Z95.2]  Not Applicable    Acute on chronic diastolic heart failure [I50.33]  Yes    Chronic diastolic heart failure [I50.32]  Yes    Aortic stenosis [I35.0]  Yes    Hypercholesterolemia [E78.00]  Yes    CAD (coronary artery disease) [I25.10]  Yes      Resolved Hospital Problems    Diagnosis Date Resolved POA   No resolved problems to  display.       Problems Addressed Today:    Hypotension  -Likely due to dehydration and BP meds / lasix and poor oral intake nena-operatively.  -In the setting of severe AS s/p TAVR, pt is pre-load dependent and may lose CO due to A fib with RVR loss of atrial kick.  -As a result of hypotension, labs reveal YOLANDA, likely pre-renal  -Bedside echo by cardiology demonstrated no pericardial effusion, small IVC  -BP improved with IVF, s/p 1L NS.    -Hold home BP meds for 24 hours: Amlodipine, lisinopril and lasix.  -Per Cardiology recs: can resume lisinopril at 10 mg tomorrow if renal function returns to back to baseline and her BP is > 130 mm Hg. If her BP is 110-130 tomorrow, should decrease lisinopril dose to 5 mg. If BP is <110 tomorrow, discontinue lisinopril for now.      YOLANDA (acute kidney injury)    -Seen on admit labs  -FeUrea pending, FENA labs pending  -Likely related to dehydration and hypotension +/- medications  -Strict I/O  -Avoid nephrotoxic agents  -Started fluid infusion 5/25.    Atrial tachycardia  --S/p pacemaker placement 5/23 for sick sinus syndrome and atrial tachycardia   --Continue Keflex 500mg TID for 4 more days  --Evaluated by Cards in the ED:  --ECG suggestive of atrial flutter.  --PPM interrogation demonstrates possible atrial flutter with tachycardia cycle length around 220 ms. Mostly regular.   --CHADS-VASC score 4. Has history of subdural hematoma. Won't initiate anticoagulation for now.  --metoprolol 25 mg BID for rate control. Can uptitrate the dose based on heart rate and BP.  --Continue with propafenone for now.  --Patient may need ablation down the road given she had recurrent/symptomatic AT    CAD (coronary artery disease)  -EKG reveals no ST changes  -Continue home ASA, plavix and statin  -Holding isosorbide for hypotension.  Can resume 5/26 per cards recs.      Bruce Mike, MS3  -[facility] Reviewed

## 2017-05-25 NOTE — PROCEDURES
PRE-TEST DATA   DEVICES: [employer]    TEST DESCRIPTION   Follow-Up Analysis:  Chamber type:  Dual Chamber PPM  Mode: AAIR = DDDR    Lower limit rate is 60 bpm  Upper tracking rate is 130 bpm  Max sensor rate is 130 bpm      LEADS:  RA Lead  P-wave: 1.3 mV  Impedance: 418 Ohms  Paced: 0 %    RV Lead:  R-wave: 10.8 mV  Impedance: 532 Ohms  Paced: 0.9 %    Thresholds:  RA Lead 3.5 V at 0.4 ms   RV Lead 3.5 V at 0.4 ms     Events:  AT/% of the time    Wound Comments:  Fresh incision clean      Reprogramming Comments:  - No reprogramming performed.    General Comments:  - Interrogation was performed in the ED because of tachycardia.  - EGM is most consistent with an atrial flutter with tachycardia cycle length 210 to 225 ms and variable AV block with ventricular rate around 130 bpm.    Arian Mccray MD  Cardiology Fellow, PGY-VI  Pager: [phone]  5/25/2017, 11:13 AM

## 2017-05-26 VITALS
RESPIRATION RATE: 18 BRPM | HEART RATE: 100 BPM | HEIGHT: 60 IN | SYSTOLIC BLOOD PRESSURE: 118 MMHG | TEMPERATURE: 98 F | BODY MASS INDEX: 26.5 KG/M2 | WEIGHT: 135 LBS | DIASTOLIC BLOOD PRESSURE: 70 MMHG | OXYGEN SATURATION: 99 %

## 2017-05-26 LAB
ALBUMIN SERPL BCP-MCNC: 3.3 G/DL
ALP SERPL-CCNC: 91 U/L
ALT SERPL W/O P-5'-P-CCNC: 8 U/L
ANION GAP SERPL CALC-SCNC: 8 MMOL/L
ANISOCYTOSIS BLD QL SMEAR: SLIGHT
AST SERPL-CCNC: 25 U/L
BASOPHILS # BLD AUTO: 0.09 K/UL
BASOPHILS NFR BLD: 1.2 %
BILIRUB SERPL-MCNC: 0.4 MG/DL
BUN SERPL-MCNC: 21 MG/DL
CALCIUM SERPL-MCNC: 8.9 MG/DL
CHLORIDE SERPL-SCNC: 112 MMOL/L
CO2 SERPL-SCNC: 23 MMOL/L
CREAT SERPL-MCNC: 0.7 MG/DL
DIFFERENTIAL METHOD: ABNORMAL
EOSINOPHIL # BLD AUTO: 0.5 K/UL
EOSINOPHIL NFR BLD: 5.9 %
ERYTHROCYTE [DISTWIDTH] IN BLOOD BY AUTOMATED COUNT: 15.8 %
EST. GFR  (AFRICAN AMERICAN): >60 ML/MIN/1.73 M^2
EST. GFR  (NON AFRICAN AMERICAN): >60 ML/MIN/1.73 M^2
GLUCOSE SERPL-MCNC: 85 MG/DL
HCT VFR BLD AUTO: 31.1 %
HGB BLD-MCNC: 10 G/DL
HYPOCHROMIA BLD QL SMEAR: ABNORMAL
LYMPHOCYTES # BLD AUTO: 1.8 K/UL
LYMPHOCYTES NFR BLD: 22.8 %
MAGNESIUM SERPL-MCNC: 2.2 MG/DL
MCH RBC QN AUTO: 22.8 PG
MCHC RBC AUTO-ENTMCNC: 32.2 %
MCV RBC AUTO: 71 FL
MONOCYTES # BLD AUTO: 0.9 K/UL
MONOCYTES NFR BLD: 11.2 %
NEUTROPHILS # BLD AUTO: 4.5 K/UL
NEUTROPHILS NFR BLD: 58.9 %
PLATELET # BLD AUTO: 174 K/UL
PLATELET BLD QL SMEAR: ABNORMAL
PMV BLD AUTO: ABNORMAL FL
POIKILOCYTOSIS BLD QL SMEAR: SLIGHT
POLYCHROMASIA BLD QL SMEAR: ABNORMAL
POTASSIUM SERPL-SCNC: 4.4 MMOL/L
PROT SERPL-MCNC: 6.3 G/DL
RBC # BLD AUTO: 4.38 M/UL
SCHISTOCYTES BLD QL SMEAR: PRESENT
SODIUM SERPL-SCNC: 143 MMOL/L
SPHEROCYTES BLD QL SMEAR: ABNORMAL
TARGETS BLD QL SMEAR: ABNORMAL
WBC # BLD AUTO: 7.69 K/UL

## 2017-05-26 PROCEDURE — 25000003 PHARM REV CODE 250: Performed by: HOSPITALIST

## 2017-05-26 PROCEDURE — 97161 PT EVAL LOW COMPLEX 20 MIN: CPT

## 2017-05-26 PROCEDURE — 97166 OT EVAL MOD COMPLEX 45 MIN: CPT

## 2017-05-26 PROCEDURE — 36415 COLL VENOUS BLD VENIPUNCTURE: CPT

## 2017-05-26 PROCEDURE — 83735 ASSAY OF MAGNESIUM: CPT

## 2017-05-26 PROCEDURE — 99239 HOSP IP/OBS DSCHRG MGMT >30: CPT | Mod: ,,, | Performed by: HOSPITALIST

## 2017-05-26 PROCEDURE — 80053 COMPREHEN METABOLIC PANEL: CPT

## 2017-05-26 PROCEDURE — 25000003 PHARM REV CODE 250: Performed by: STUDENT IN AN ORGANIZED HEALTH CARE EDUCATION/TRAINING PROGRAM

## 2017-05-26 PROCEDURE — 85025 COMPLETE CBC W/AUTO DIFF WBC: CPT

## 2017-05-26 PROCEDURE — 97165 OT EVAL LOW COMPLEX 30 MIN: CPT

## 2017-05-26 RX ORDER — LISINOPRIL 5 MG/1
5 TABLET ORAL DAILY
Status: DISCONTINUED | OUTPATIENT
Start: 2017-05-26 | End: 2017-05-26 | Stop reason: HOSPADM

## 2017-05-26 RX ORDER — FUROSEMIDE 40 MG/1
40 TABLET ORAL DAILY PRN
Qty: 30 TABLET | Refills: 0 | Status: SHIPPED | OUTPATIENT
Start: 2017-05-26

## 2017-05-26 RX ORDER — LISINOPRIL 5 MG/1
5 TABLET ORAL DAILY
Qty: 30 TABLET | Refills: 6 | Status: SHIPPED | OUTPATIENT
Start: 2017-05-26 | End: 2017-05-26

## 2017-05-26 RX ORDER — LISINOPRIL 5 MG/1
5 TABLET ORAL DAILY
Qty: 30 TABLET | Refills: 6 | Status: SHIPPED | OUTPATIENT
Start: 2017-05-26 | End: 2018-05-26

## 2017-05-26 RX ORDER — METOPROLOL TARTRATE 25 MG/1
25 TABLET, FILM COATED ORAL 2 TIMES DAILY
Qty: 60 TABLET | Refills: 11 | Status: SHIPPED | OUTPATIENT
Start: 2017-05-26 | End: 2017-05-26 | Stop reason: SDUPTHER

## 2017-05-26 RX ORDER — AMLODIPINE BESYLATE 2.5 MG/1
2.5 TABLET ORAL 2 TIMES DAILY
Qty: 1 TABLET | Refills: 0
Start: 2017-05-26 | End: 2018-05-29

## 2017-05-26 RX ORDER — FUROSEMIDE 40 MG/1
40 TABLET ORAL DAILY PRN
Qty: 30 TABLET | Refills: 0 | Status: SHIPPED | OUTPATIENT
Start: 2017-05-26 | End: 2017-05-26

## 2017-05-26 RX ADMIN — METOPROLOL TARTRATE 25 MG: 25 TABLET ORAL at 09:05

## 2017-05-26 RX ADMIN — LISINOPRIL 5 MG: 5 TABLET ORAL at 09:05

## 2017-05-26 RX ADMIN — CEPHALEXIN 500 MG: 500 CAPSULE ORAL at 01:05

## 2017-05-26 RX ADMIN — PANTOPRAZOLE SODIUM 40 MG: 40 TABLET, DELAYED RELEASE ORAL at 09:05

## 2017-05-26 RX ADMIN — ACETAMINOPHEN 650 MG: 325 TABLET ORAL at 01:05

## 2017-05-26 RX ADMIN — CEPHALEXIN 500 MG: 500 CAPSULE ORAL at 06:05

## 2017-05-26 RX ADMIN — ROSUVASTATIN CALCIUM 10 MG: 10 TABLET ORAL at 09:05

## 2017-05-26 RX ADMIN — PROPAFENONE HYDROCHLORIDE 150 MG: 150 TABLET, FILM COATED ORAL at 09:05

## 2017-05-26 RX ADMIN — ASPIRIN 81 MG: 81 TABLET, COATED ORAL at 09:05

## 2017-05-26 RX ADMIN — CLOPIDOGREL 75 MG: 75 TABLET, FILM COATED ORAL at 09:05

## 2017-05-26 RX ADMIN — FOLIC ACID 1 MG: 1 TABLET ORAL at 09:05

## 2017-05-26 NOTE — PLAN OF CARE
Problem: Patient Care Overview  Goal: Plan of Care Review  Outcome: Outcome(s) achieved Date Met: 05/26/17  Patient being discharge din stable condition.  Daughter at bedside and also verbalized understanding of discharge instructions, medications and follow up appointments.  Educated both pt and family on BP parameters.  IV removed with catheter intact.

## 2017-05-26 NOTE — ASSESSMENT & PLAN NOTE
-Seen on admit labs  --FeUrea 41.7% suggests intrinsic cause, (FeNa 0.8 suggestive of pre-renal cause).  FeUrea more reliable given she had been taking lasix.  --resolved with fluids.  -Likely related to dehydration and hypotension +/- medications  -Strict I/O  -Avoid nephrotoxic agents

## 2017-05-26 NOTE — PLAN OF CARE
Problem: Physical Therapy Goal  Goal: Physical Therapy Goal  Goals to be met by: 2017     Patient will increase functional independence with mobility by performin. Supine to sit with Modified [place]  2. Sit to supine with Modified [place]  3. Sit to stand transfer with Modified [place]  4. Gait  x 200 feet with Modified [place] using Single-point Cane .   5. Lower extremity exercise program x15 reps per handout, with independence    Outcome: Ongoing (interventions implemented as appropriate)  Pt evaluation complete.     HUEY JETER, PT  2017

## 2017-05-26 NOTE — PLAN OF CARE
[facility]    HOME HEALTH ORDERS  FACE TO FACE ENCOUNTER    Patient Name: Loreta M Damico  YOB: 1935    PCP: Javan Castillo MD   PCP Address: [address] [place] [address] [address] / [place] [address]  PCP Phone Number: [phone]  PCP Fax: [phone]    Encounter Date: 05/26/2017    Admit to Home Health    Diagnoses:  Active Hospital Problems    Diagnosis  POA    *Atrial tachycardia [I47.1]  Yes     Priority: 2     Hypotension [I95.9]  Unknown     Priority: 1 - High    YOLANDA (acute kidney injury) [N17.9]  Yes     Priority: 2     CAD (coronary artery disease) [I25.10]  Yes     Priority: 4     Dehydration [E86.0]  Yes    Afib [I48.91]  Yes    Coronary artery disease involving autologous vein bypass graft [I25.810]  Yes    Sick sinus syndrome [I49.5]  Yes    S/P TAVR (transcatheter aortic valve replacement) [Z95.2]  Not Applicable    Acute on chronic diastolic heart failure [I50.33]  Yes    Chronic diastolic heart failure [I50.32]  Yes    Aortic stenosis [I35.0]  Yes    Hypercholesterolemia [E78.00]  Yes      Resolved Hospital Problems    Diagnosis Date Resolved POA   No resolved problems to display.       Future Appointments  Date Time Provider Department Center   6/6/2017 9:00 AM PACEMAKER, ICD [facility] Jorge L Sheldon           I have seen and examined this patient face to face today. My clinical findings that support the need for the home health skilled services and home bound status are the following:  Weakness/numbness causing balance and gait disturbance due to Weakness/Debility making it taxing to leave home.    Allergies:  Review of patient's allergies indicates:   Allergen Reactions    Codeine Nausea And Vomiting       Diet: cardiac diet    Activities: activity as tolerated    Nursing:   SN to complete comprehensive assessment including routine vital signs. Instruct on disease process and s/s of complications to report to MD. Review/verify medication list sent  home with the patient at time of discharge  and instruct patient/caregiver as needed. Frequency may be adjusted depending on start of care date.    Notify MD if SBP > 160 or < 90; DBP > 90 or < 50; HR > 120 or < 50; Temp > 101; Other:         CONSULTS:    Physical Therapy to evaluate and treat. Evaluate for home safety and equipment needs; Establish/upgrade home exercise program. Perform / instruct on therapeutic exercises, gait training, transfer training, and Range of Motion.  Occupational Therapy to evaluate and treat. Evaluate home environment for safety and equipment needs. Perform/Instruct on transfers, ADL training, ROM, and therapeutic exercises.   to evaluate for community resources/long-range planning.    MISCELLANEOUS CARE:  N/A    WOUND CARE ORDERS  n/a      Medications: Review discharge medications with patient and family and provide education.      Current Discharge Medication List      CONTINUE these medications which have CHANGED    Details   amlodipine (NORVASC) 2.5 MG tablet Take 1 tablet (2.5 mg total) by mouth 2 (two) times daily. Do not resume until instructed by PCP at follow up appointment.  Qty: 1 tablet, Refills: 0      furosemide (LASIX) 40 MG tablet Take 1 tablet (40 mg total) by mouth daily as needed. Daily as needed for 5 lbs weight gain in 48 hours  Qty: 30 tablet, Refills: 0      lisinopril (PRINIVIL,ZESTRIL) 5 MG tablet Take 1 tablet (5 mg total) by mouth once daily.  Qty: 30 tablet, Refills: 6         CONTINUE these medications which have NOT CHANGED    Details   acetaminophen (TYLENOL) 500 MG tablet Take 1,000 mg by mouth as needed for Pain.      aspirin (ECOTRIN) 81 MG EC tablet Take 81 mg by mouth once daily.      cephALEXin (KEFLEX) 500 MG capsule Take 1 capsule (500 mg total) by mouth every 8 (eight) hours.  Qty: 15 capsule, Refills: 0      clopidogrel (PLAVIX) 75 mg tablet Take 75 mg by mouth once daily.      cyanocobalamin 1,000 mcg/mL injection 1,000 mcg every 30  days.       folic acid (FOLVITE) 1 MG tablet Take 1 mg by mouth once daily.       levothyroxine (SYNTHROID) 50 MCG tablet Take 50 mcg by mouth once daily.       metoprolol tartrate (LOPRESSOR) 25 MG tablet Take 1 tablet (25 mg total) by mouth 2 (two) times daily.  Qty: 60 tablet, Refills: 3      pantoprazole (PROTONIX) 40 MG tablet Take 40 mg by mouth 2 (two) times daily.       PROLIA 60 mg/mL Syrg Inject 60 mg as directed every 6 (six) months.       propafenone (RHTHYMOL) 150 MG Tab Take 150 mg by mouth 2 (two) times daily.      rosuvastatin (CRESTOR) 10 MG tablet Take 10 mg by mouth once daily.         STOP taking these medications       isosorbide mononitrate (IMDUR) 60 MG 24 hr tablet Comments:   Reason for Stopping:         potassium chloride (MICRO-K) 10 MEQ CpSR Comments:   Reason for Stopping:               I certify that this patient is confined to her home and needs physical therapy and occupational therapy.

## 2017-05-26 NOTE — PROGRESS NOTES
[facility] Hospital Medicine  Progress Note    Patient Name: Loreta M Damico  MRN: [id number]  Patient Class: IP- Inpatient   Admission Date: 5/25/2017  Length of Stay: 1 days  Attending Physician: Kenyatta Mcdowell MD  Primary Care Provider: Javan Castillo MD    [facility] Medicine Team: [facility] HOSP MED 5 Asia Davila MD    Subjective:     Principal Problem:Atrial tachycardia    HPI:  80 yo F PMH of HTN, HLD, severe AS s/p TAVR, CAD s/p CABG and ostial RCA stent, carotid disease s/p stenting of right ICA, subdural hematoma, sinus pause detected with ILR s/p dual chamber PPM placement on 5/23/17 and atrial tachycardia detected on ILR. She was discharged from the hospital yesterday after receiving her PPM the day before.  Since then she felt weak and tired when she went home. Her heart rate was in the 130s and BP was as low as 60/40 when checked with the home BP cuff. She was prescribed metoprolol on discharge for her tachycardia. However, because of a problem with the prescription, she could not take the metoprolol. However she continued with the other meds. She did not feel better this morning. So they came to the ED.  She denies any SOB, PND, orthopnea, leg edema, bleeding, fever, chills, cough, dysuria, abdominal pain, chest pain, near syncope or syncope. She reports feeling some lightheadedness and palpitations. No focal neurological deficits.  On presentation to the ED, her SBP was 85 and improved to 105 with 1 liter of normal saline. She reports feeling better compared to the time she came in.    Hospital Course:  No notes on file    Past Medical History:   Diagnosis Date    Afib 5/25/2017    Anticoagulant long-term use     Arthritis     Carotid artery occlusion     CHF (congestive heart failure)     Coronary artery disease     Encounter for blood transfusion     Hyperlipidemia     Hypertension     PONV (postoperative nausea and vomiting)     Thyroid disease        Past Surgical  History:   Procedure Laterality Date    CARDIAC CATHETERIZATION      has had stents placed    CARDIAC VALVE SURGERY      CHOLECYSTECTOMY      CORONARY ANGIOPLASTY      CORONARY ARTERY BYPASS GRAFT      EYE SURGERY      HYSTERECTOMY      loop recorder placement          Review of patient's allergies indicates:   Allergen Reactions    Codeine Nausea And Vomiting       No current facility-administered medications on file prior to encounter.      Current Outpatient Prescriptions on File Prior to Encounter   Medication Sig    acetaminophen (TYLENOL) 500 MG tablet Take 1,000 mg by mouth as needed for Pain.    aspirin (ECOTRIN) 81 MG EC tablet Take 81 mg by mouth once daily.    cephALEXin (KEFLEX) 500 MG capsule Take 1 capsule (500 mg total) by mouth every 8 (eight) hours.    cyanocobalamin 1,000 mcg/mL injection 1,000 mcg every 30 days.     folic acid (FOLVITE) 1 MG tablet Take 1 mg by mouth once daily.     levothyroxine (SYNTHROID) 50 MCG tablet Take 50 mcg by mouth once daily.     metoprolol tartrate (LOPRESSOR) 25 MG tablet Take 1 tablet (25 mg total) by mouth 2 (two) times daily.    pantoprazole (PROTONIX) 40 MG tablet Take 40 mg by mouth 2 (two) times daily.     PROLIA 60 mg/mL Syrg Inject 60 mg as directed every 6 (six) months.     propafenone (RHTHYMOL) 150 MG Tab Take 150 mg by mouth 2 (two) times daily.    rosuvastatin (CRESTOR) 10 MG tablet Take 10 mg by mouth once daily.    [DISCONTINUED] amlodipine (NORVASC) 2.5 MG tablet Take 2.5 mg by mouth 2 (two) times daily.     [DISCONTINUED] furosemide (LASIX) 40 MG tablet Take 40 mg by mouth once daily.     [DISCONTINUED] isosorbide mononitrate (IMDUR) 60 MG 24 hr tablet Take 60 mg by mouth once daily.     [DISCONTINUED] lisinopril 10 MG tablet Take 10 mg by mouth 2 (two) times daily.     [DISCONTINUED] potassium chloride (MICRO-K) 10 MEQ CpSR Take 10 mEq by mouth once daily.      Family History     Problem Relation (Age of Onset)    Heart  disease Father    No Known Problems Sister, Brother, Maternal Grandmother, Maternal Grandfather, Paternal Grandmother, Paternal Grandfather, Brother, Maternal Aunt, Maternal Uncle, Paternal Aunt, Paternal Uncle    Stroke Mother        Social History Main Topics    Smoking status: Never Smoker    Smokeless tobacco: Never Used    Alcohol use No    Drug use: No    Sexual activity: Not on file     Review of Systems   Constitutional: Negative for chills and fever.   HENT: Negative for sneezing and sore throat.    Eyes: Negative for pain and visual disturbance.   Respiratory: Negative for cough, shortness of breath and wheezing.    Cardiovascular: Negative for chest pain, palpitations and leg swelling.   Gastrointestinal: Negative for abdominal pain, constipation, diarrhea and nausea.   Genitourinary: Negative for dysuria, frequency and urgency.   Allergic/Immunologic: Negative for environmental allergies.   Neurological: Negative for dizziness, light-headedness, numbness and headaches.   Psychiatric/Behavioral: Negative for agitation. The patient is not nervous/anxious.      Objective:     Vital Signs (Most Recent):  Temp: 98.1 °F (36.7 °C) (05/26/17 0803)  Pulse: 91 (05/26/17 0803)  Resp: 18 (05/26/17 0803)  BP: (!) 141/69 (05/26/17 0803)  SpO2: 97 % (05/26/17 0803) Vital Signs (24h Range):  Temp:  [96.5 °F (35.8 °C)-98.9 °F (37.2 °C)] 98.1 °F (36.7 °C)  Pulse:  [] 91  Resp:  [16-20] 18  SpO2:  [96 %-100 %] 97 %  BP: (100-141)/(63-79) 141/69     Weight: 61.2 kg (135 lb)  Body mass index is 26.37 kg/m².    Physical Exam   Constitutional: She is oriented to person, place, and time. She appears well-developed and well-nourished. No distress.   HENT:   Head: Normocephalic and atraumatic.   Nose: Nose normal.   Eyes: EOM are normal. No scleral icterus.   Neck: Normal range of motion. No tracheal deviation present.   Cardiovascular: Normal rate.  Exam reveals no gallop and no friction rub.    No murmur  heard.  Irregular rhythm   Pulmonary/Chest: Effort normal. No respiratory distress. She has no wheezes.   Crackles audible in the middle and lower R lung field as well as lower L lung field   Abdominal: Soft. Bowel sounds are normal. There is no tenderness.   Musculoskeletal: She exhibits no edema.   Neurological: She is alert and oriented to person, place, and time.   Skin: Skin is warm and dry.        Significant Labs:   CBC:     Recent Labs  Lab 05/25/17  0828 05/26/17 0456   WBC 11.53 7.69   HGB 10.2* 10.0*   HCT 30.8* 31.1*    174     CMP:     Recent Labs  Lab 05/25/17  0828 05/26/17 0456    143   K 4.2 4.4    112*   CO2 20* 23   GLU 98 85   BUN 40* 21   CREATININE 1.2 0.7   CALCIUM 8.0* 8.9   PROT 6.5 6.3   ALBUMIN 3.4* 3.3*   BILITOT 0.5 0.4   ALKPHOS 92 91   AST 22 25   ALT 6* 8*   ANIONGAP 9 8   EGFRNONAA 42.8* >60.0       Significant Imaging: I have reviewed all pertinent imaging results/findings within the past 24 hours.    Assessment/Plan:      Hypotension    -Likely due to dehydration and BP meds / lasix and poor oral intake nena-operatively.  -In the setting of severe AS s/p TAVR, pt is pre-load dependent and may lose CO due to A fib with RVR loss of atrial kick.  -As a result of hypotension, labs reveal YOLANDA, likely pre-renal  -Bedside echo by cardiology demonstrated no pericardial effusion, small IVC  -BP improved with IVF, s/p 1L NS.    -Hold home BP meds for first 24 hours (Amlodipine, lisinopril and lasix)  -Per Cardiology recs: resumed lisinopril to 5 mg daily.  -she reports her dizziness has resolved.        YOLANDA (acute kidney injury)      -Seen on admit labs  --FeUrea 41.7% suggests intrinsic cause, (FeNa 0.8 suggestive of pre-renal cause).  FeUrea more reliable given she had been taking lasix.  --resolved with fluids.  -Likely related to dehydration and hypotension +/- medications  -Strict I/O  -Avoid nephrotoxic agents          * Atrial tachycardia    --S/p pacemaker  placement 5/23 for sick sinus syndrome and atrial tachycardia   --Continue Keflex 500mg TID for 4 more days  --Evaluated by Cards in the ED:  --ECG suggestive of atrial flutter.  --PPM interrogation demonstrates possible atrial flutter with tachycardia cycle length around 220 ms. Mostly regular.   --CHADS-VASC score 4. Has history of subdural hematoma. Won't initiate anticoagulation for now.  --metoprolol 25 mg BID for rate control. Can uptitrate the dose based on heart rate and BP.  --Continue with propafenone for now.  --Patient may need ablation down the road given she had recurrent/symptomatic AT.  Will likely discharge her today and have her follow up with cardiology outpatient.  Has an appointment on 6/6.        CAD (coronary artery disease)    -EKG reveals no ST changes  -Continue home ASA, plavix and statin  -Holding isosorbide for hypotension.  Can resume 5/26 per cards recs however will continue to hold for now given low normal blood pressures.  Can be resumed by outpatient.          VTE Risk Mitigation         Ordered     Medium Risk of VTE  Once      05/25/17 1248     Place CHARO hose  Until discontinued      05/25/17 1248     Place sequential compression device  Until discontinued      05/25/17 1248     Place CHARO hose  Until discontinued      05/25/17 1248          Asia Davila MD  Department of Hospital Medicine   [facility]

## 2017-05-26 NOTE — PLAN OF CARE
05/26/17 1552   Final Note   Assessment Type Final Discharge Note   Discharge Disposition Home-Health   Discharge planning education complete? Yes   Hospital Follow Up  Appt(s) scheduled? Yes   Discharge plans and expectations educations in teach back method with documentation complete? Yes   Offered [facility] -- Bedside Delivery? n/a   Discharge/Hospital Encounter Summary to (non-[facility]) PCP n/a   Referral to Outpatient Case Management complete? n/a   Referral to / orders for Home Health Complete? Yes  ([facility])   30 day supply of medicines given at discharge, if documented non-compliance / non-adherence? n/a   Any social issues identified prior to discharge? No   Did you assess the readiness or willingness of the family or caregiver to support self management of care? Yes     Future Appointments  Date Time Provider Department Center   6/6/2017 9:00 AM PACEMAKER, ICD [facility] Jorge L Sheldon

## 2017-05-26 NOTE — DISCHARGE SUMMARY
[facility] Hospital Medicine  Discharge Summary      Patient Name: Loreta M Damico  MRN: [id number]  Admission Date: 5/25/2017  Hospital Length of Stay: 1 days  Discharge Date and Time: No discharge date for patient encounter.  Attending Physician: Kenyatta Mcdowell MD   Discharging Provider: Asia Davila MD  Primary Care Provider: Javan Castillo MD  [facility] Medicine Team: [facility] HOSP MED 5 Asia Davila MD    HPI:   82 yo F PMH of HTN, HLD, severe AS s/p TAVR, CAD s/p CABG and ostial RCA stent, carotid disease s/p stenting of right ICA, subdural hematoma, sinus pause detected with ILR s/p dual chamber PPM placement on 5/23/17 and atrial tachycardia detected on ILR. She was discharged from the hospital yesterday after receiving her PPM the day before.  Since then she felt weak and tired when she went home. Her heart rate was in the 130s and BP was as low as 60/40 when checked with the home BP cuff. She was prescribed metoprolol on discharge for her tachycardia. However, because of a problem with the prescription, she could not take the metoprolol. However she continued with the other meds. She did not feel better this morning. So they came to the ED.  She denies any SOB, PND, orthopnea, leg edema, bleeding, fever, chills, cough, dysuria, abdominal pain, chest pain, near syncope or syncope. She reports feeling some lightheadedness and palpitations. No focal neurological deficits.  On presentation to the ED, her SBP was 85 and improved to 105 with 1 liter of normal saline. She reports feeling better compared to the time she came in.    * No surgery found *      Indwelling Lines/Drains at time of discharge:   Lines/Drains/Airways          No matching active lines, drains, or airways        Hospital Course:   Her hypotension was deemed to be due to a combination of antihypertensives, diuretics, and possibly poor oral intake.  Her antihypertensives and diuretics were thus held.  Her hypotension  may have contributed to her YOLANDA, although an FeUrea was suggestive of an intrinsic cause. Nonetheless her YOLANDA resolved with IVFs.  In terms of her atrial tachycardia, interrogation revealed possible aflutter with tachycardia, mostly regular.  She was kept on metoprolol for rate control.  She will follow up with cardiology on 6/6. She may need an ablation given she had recurrent/symptomatic AT.  She was discharged on 5/26 after it was proven she could maintain her pressures with resumption of low dose lisinopril.       Consults:   Consults         Status Ordering Provider     Inpatient consult to Cardiology  Once     Provider:  (Not yet assigned)    AMELIA Hurley            Pending Diagnostic Studies:     None        Final Active Diagnoses:    Diagnosis Date Noted POA    PRINCIPAL PROBLEM:  Atrial tachycardia [I47.1] 05/22/2017 Yes    Hypotension [I95.9] 05/25/2017 Unknown    YOLANDA (acute kidney injury) [N17.9] 05/25/2017 Yes    CAD (coronary artery disease) [I25.10] 03/09/2015 Yes    Dehydration [E86.0] 05/25/2017 Yes    Afib [I48.91] 05/25/2017 Yes    Coronary artery disease involving autologous vein bypass graft [I25.810] 05/25/2017 Yes    Sick sinus syndrome [I49.5] 05/22/2017 Yes    S/P TAVR (transcatheter aortic valve replacement) [Z95.2] 05/16/2015 Not Applicable    Acute on chronic diastolic heart failure [I50.33] 05/13/2015 Yes    Chronic diastolic heart failure [I50.32] 05/13/2015 Yes    Aortic stenosis [I35.0] 03/09/2015 Yes    Hypercholesterolemia [E78.00] 03/09/2015 Yes      Problems Resolved During this Admission:    Diagnosis Date Noted Date Resolved POA      No new Assessment & Plan notes have been filed under this hospital service since the last note was generated.  Service: Hospital Medicine      Discharged Condition: fair    Disposition: Home-Health Care [facility]    Follow Up:  Follow up with cardiology, review antihypertensive regimen, determine the need to continue  imdur    Patient Instructions:     Diet Cardiac     Activity as tolerated       Medications:  Reconciled Home Medications:   Current Discharge Medication List      CONTINUE these medications which have CHANGED    Details   amlodipine (NORVASC) 2.5 MG tablet Take 1 tablet (2.5 mg total) by mouth 2 (two) times daily. Do not resume until instructed by PCP at follow up appointment.  Qty: 1 tablet, Refills: 0      furosemide (LASIX) 40 MG tablet Take 1 tablet (40 mg total) by mouth daily as needed. Daily as needed for 5 lbs weight gain in 48 hours  Qty: 30 tablet, Refills: 0      lisinopril (PRINIVIL,ZESTRIL) 5 MG tablet Take 1 tablet (5 mg total) by mouth once daily.  Qty: 30 tablet, Refills: 6         CONTINUE these medications which have NOT CHANGED    Details   acetaminophen (TYLENOL) 500 MG tablet Take 1,000 mg by mouth as needed for Pain.      aspirin (ECOTRIN) 81 MG EC tablet Take 81 mg by mouth once daily.      cephALEXin (KEFLEX) 500 MG capsule Take 1 capsule (500 mg total) by mouth every 8 (eight) hours.  Qty: 15 capsule, Refills: 0      clopidogrel (PLAVIX) 75 mg tablet Take 75 mg by mouth once daily.      cyanocobalamin 1,000 mcg/mL injection 1,000 mcg every 30 days.       folic acid (FOLVITE) 1 MG tablet Take 1 mg by mouth once daily.       levothyroxine (SYNTHROID) 50 MCG tablet Take 50 mcg by mouth once daily.       metoprolol tartrate (LOPRESSOR) 25 MG tablet Take 1 tablet (25 mg total) by mouth 2 (two) times daily.  Qty: 60 tablet, Refills: 3      pantoprazole (PROTONIX) 40 MG tablet Take 40 mg by mouth 2 (two) times daily.       PROLIA 60 mg/mL Syrg Inject 60 mg as directed every 6 (six) months.       propafenone (RHTHYMOL) 150 MG Tab Take 150 mg by mouth 2 (two) times daily.      rosuvastatin (CRESTOR) 10 MG tablet Take 10 mg by mouth once daily.         STOP taking these medications       isosorbide mononitrate (IMDUR) 60 MG 24 hr tablet Comments:   Reason for Stopping:         potassium chloride  (MICRO-K) 10 MEQ CpSR Comments:   Reason for Stopping:               Asia Davila MD  Department of Hospital Medicine  [facility]

## 2017-05-26 NOTE — PT/OT/SLP EVAL
Physical Therapy  Evaluation    Loreta M Damico   MRN: [id number]   Admitting Diagnosis: Atrial tachycardia    PT Received On: 05/26/17  PT Start Time: 1005     PT Stop Time: 1025    PT Total Time (min): 20 min       Billable Minutes:  Evaluation 20    Diagnosis: Atrial tachycardia    Past Medical History:   Diagnosis Date    Afib 5/25/2017    Anticoagulant long-term use     Arthritis     Carotid artery occlusion     CHF (congestive heart failure)     Coronary artery disease     Encounter for blood transfusion     Hyperlipidemia     Hypertension     PONV (postoperative nausea and vomiting)     Thyroid disease       Past Surgical History:   Procedure Laterality Date    CARDIAC CATHETERIZATION      has had stents placed    CARDIAC VALVE SURGERY      CHOLECYSTECTOMY      CORONARY ANGIOPLASTY      CORONARY ARTERY BYPASS GRAFT      EYE SURGERY      HYSTERECTOMY      loop recorder placement          Referring physician: ANGEL Mcdowell  Date referred to PT: 05/25/2017    General Precautions: Standard, fall  Orthopedic Precautions: LUE non weight bearing   Braces: UE Sling (with swath)            Patient History:  Lives With: alone  Living Arrangements: house  Home Layout: Able to live on 1st floor  Living Environment Comment: Pt lives alone in 1-story house without SALAZAR. Pt reports amb with SC and (I) with ADLs. Pt reports daughter lives nearby and able to provide assist if needed.   Equipment Currently Used at Home: cane, quad, cane, straight, rollator  DME owned (not currently used): quad cane and rollator    Previous Level of Function:  Ambulation Skills: needs device  Transfer Skills: independent  ADL Skills: independent    Subjective:  Communicated with RN prior to session.  Pt agreeable to therapy session.   Chief Complaint: NA  Patient goals: return home    Pain/Comfort  Pain Rating 1: 0/10  Pain Rating Post-Intervention 1: 0/10      Objective:   Patient found with: telemetry     Cognitive Exam:  Oriented  to: Person, Place, Time and Situation    Follows Commands/attention: Follows multistep  commands  Communication: clear/fluent  Safety awareness/insight to disability: impaired    Physical Exam:  Postural examination/scapula alignment: Rounded shoulder    Skin integrity: Visible skin intact  Edema: None noted B LE    Sensation:   Intact  light/touch B LE    Lower Extremity Range of Motion:  Right Lower Extremity: WFL  Left Lower Extremity: WFL    Lower Extremity Strength:  Right Lower Extremity: WFL  Left Lower Extremity: WFL     Gross motor coordination: WFL    Functional Mobility:  Bed Mobility:  Supine to Sit: Supervision    Transfers:  Sit <> Stand Assistance: Supervision  Sit <> Stand Assistive Device: Straight Cane    Gait:   Gait Distance: ~100ft no LOB and mild SOB  Assistance 1: Supervision  Gait Assistive Device: Single point cane  Gait Pattern: reciprocal  Gait Deviation(s): decreased johana, decreased step length, decreased stride length    Balance:   Static Sit: GOOD: Takes MODERATE challenges from all directions  Dynamic Sit: GOOD: Maintains balance through MODERATE excursions of active trunk movement  Static Stand: FAIR+: Takes MINIMAL challenges from all directions  Dynamic stand: FAIR+: Needs CLOSE SUPERVISION during gait and is able to right self with minor LOB    Therapeutic Activities and Exercises:  Pt educated on role of PT/POC.  Pt unable to report pacemaker precautions; pt given handout and education provided.  Pt required assist with adjusting sling and swath.  Pt safe to amb in hallway with RN staff with SC.     AM-PAC 6 CLICK MOBILITY  How much help from another person does this patient currently need?   1 = Unable, Total/Dependent Assistance  2 = A lot, Maximum/Moderate Assistance  3 = A little, Minimum/Contact Guard/Supervision  4 = None, Modified [place]/Independent    Turning over in bed (including adjusting bedclothes, sheets and blankets)?: 4  Sitting down on and standing up  from a chair with arms (e.g., wheelchair, bedside commode, etc.): 3  Moving from lying on back to sitting on the side of the bed?: 3  Moving to and from a bed to a chair (including a wheelchair)?: 3  Need to walk in hospital room?: 3  Climbing 3-5 steps with a railing?: 3  Total Score: 19     AM-PAC Raw Score CMS G-Code Modifier Level of Impairment Assistance   6 % Total / Unable   7 - 9 CM 80 - 100% Maximal Assist   10 - 14 CL 60 - 80% Moderate Assist   15 - 19 CK 40 - 60% Moderate Assist   20 - 22 CJ 20 - 40% Minimal Assist   23 CI 1-20% SBA / CGA   24 CH 0% Independent/ Mod I     Patient left up in chair with all lines intact, call button in reach and RN notified.    Assessment:   Loreta M Damico is a 81 y.o. female with a medical diagnosis of Atrial tachycardia and presents with decreased safety awareness and overall functional mobility. Pt performed bed mobility and transfer S. Pt amb ~100ft with SC S; no LOB and mild SOB. Pt will benefit from skilled PT to improve deficits and increase overall functional mobility.    Rehab identified problem list/impairments: Rehab identified problem list/impairments: gait instability, impaired endurance, decreased safety awareness, impaired functional mobilty, impaired balance    Rehab potential is good.    Activity tolerance: Good    Discharge recommendations: Discharge Facility/Level Of Care Needs: home with home health     Barriers to discharge: Barriers to Discharge: None    Equipment recommendations: Equipment Needed After Discharge: shower chair     GOALS:    Physical Therapy Goals        Problem: Physical Therapy Goal    Goal Priority Disciplines Outcome Goal Variances Interventions   Physical Therapy Goal     PT/OT, PT Ongoing (interventions implemented as appropriate)     Description:  Goals to be met by: 2017     Patient will increase functional independence with mobility by performin. Supine to sit with Modified [place]  2. Sit to supine  with Modified [place]  3. Sit to stand transfer with Modified [place]  4. Gait  x 200 feet with Modified [place] using Single-point Cane .   5. Lower extremity exercise program x15 reps per handout, with independence                      PLAN:    Patient to be seen 3 x/week to address the above listed problems via gait training, therapeutic activities, therapeutic exercises  Plan of Care expires: 06/26/17  Plan of Care reviewed with: patient          HUEY STEFFANY, PT  05/26/2017

## 2017-05-26 NOTE — PLAN OF CARE
CM sent facesheet, H&P, HH orders via [facility] to [facility] per MD and pt request of agency stating she has been with [facility] in the past.  Pt to discharge home today with daughter picking her up from hospital.  CM called to verify referral received getting voice message of [facility] and CM leaving contact information to return call to CM/SW/Floor RN to notify of acceptance prior to pt discharge.

## 2017-05-26 NOTE — ASSESSMENT & PLAN NOTE
-Likely due to dehydration and BP meds / lasix and poor oral intake nena-operatively.  -In the setting of severe AS s/p TAVR, pt is pre-load dependent and may lose CO due to A fib with RVR loss of atrial kick.  -As a result of hypotension, labs reveal YOLANDA, likely pre-renal  -Bedside echo by cardiology demonstrated no pericardial effusion, small IVC  -BP improved with IVF, s/p 1L NS.    -Hold home BP meds for first 24 hours (Amlodipine, lisinopril and lasix)  -Per Cardiology recs: resumed lisinopril to 5 mg daily.  -she reports her dizziness has resolved.

## 2017-05-26 NOTE — PLAN OF CARE
CM in to see pt AAO sitting on side of bed in no distress finished eating breakfast.  Pt continues with arm sling to L arm s/p pacemaker insertion prior hospitalization.  Pt only complains of nursing staff this am not coming to her aid for help to restroom.  Pt with no needs anticipated at discharge.  CM will continue to follow.

## 2017-05-26 NOTE — ASSESSMENT & PLAN NOTE
--S/p pacemaker placement 5/23 for sick sinus syndrome and atrial tachycardia   --Continue Keflex 500mg TID for 4 more days  --Evaluated by Cards in the ED:  --ECG suggestive of atrial flutter.  --PPM interrogation demonstrates possible atrial flutter with tachycardia cycle length around 220 ms. Mostly regular.   --CHADS-VASC score 4. Has history of subdural hematoma. Won't initiate anticoagulation for now.  --metoprolol 25 mg BID for rate control. Can uptitrate the dose based on heart rate and BP.  --Continue with propafenone for now.  --Patient may need ablation down the road given she had recurrent/symptomatic AT.  Will likely discharge her today and have her follow up with cardiology outpatient.  Has an appointment on 6/6.

## 2017-05-26 NOTE — ASSESSMENT & PLAN NOTE
-EKG reveals no ST changes  -Continue home ASA, plavix and statin  -Holding isosorbide for hypotension.  Can resume 5/26 per cards recs however will continue to hold for now given low normal blood pressures.  Can be resumed by outpatient.

## 2017-05-26 NOTE — PLAN OF CARE
Problem: Patient Care Overview  Goal: Plan of Care Review  Outcome: Ongoing (interventions implemented as appropriate)  No acute events throughout shift. VS and assessment performed per orders. Patient progressing toward goals as tolerated. Plan of care reviewed with pt, all concerns addressed. Pt able to ambulate to bathroom with stand by assistance. Pt free of falls or injury.

## 2017-05-26 NOTE — SUBJECTIVE & OBJECTIVE
Past Medical History:   Diagnosis Date    Afib 5/25/2017    Anticoagulant long-term use     Arthritis     Carotid artery occlusion     CHF (congestive heart failure)     Coronary artery disease     Encounter for blood transfusion     Hyperlipidemia     Hypertension     PONV (postoperative nausea and vomiting)     Thyroid disease        Past Surgical History:   Procedure Laterality Date    CARDIAC CATHETERIZATION      has had stents placed    CARDIAC VALVE SURGERY      CHOLECYSTECTOMY      CORONARY ANGIOPLASTY      CORONARY ARTERY BYPASS GRAFT      EYE SURGERY      HYSTERECTOMY      loop recorder placement          Review of patient's allergies indicates:   Allergen Reactions    Codeine Nausea And Vomiting       No current facility-administered medications on file prior to encounter.      Current Outpatient Prescriptions on File Prior to Encounter   Medication Sig    acetaminophen (TYLENOL) 500 MG tablet Take 1,000 mg by mouth as needed for Pain.    aspirin (ECOTRIN) 81 MG EC tablet Take 81 mg by mouth once daily.    cephALEXin (KEFLEX) 500 MG capsule Take 1 capsule (500 mg total) by mouth every 8 (eight) hours.    cyanocobalamin 1,000 mcg/mL injection 1,000 mcg every 30 days.     folic acid (FOLVITE) 1 MG tablet Take 1 mg by mouth once daily.     levothyroxine (SYNTHROID) 50 MCG tablet Take 50 mcg by mouth once daily.     metoprolol tartrate (LOPRESSOR) 25 MG tablet Take 1 tablet (25 mg total) by mouth 2 (two) times daily.    pantoprazole (PROTONIX) 40 MG tablet Take 40 mg by mouth 2 (two) times daily.     PROLIA 60 mg/mL Syrg Inject 60 mg as directed every 6 (six) months.     propafenone (RHTHYMOL) 150 MG Tab Take 150 mg by mouth 2 (two) times daily.    rosuvastatin (CRESTOR) 10 MG tablet Take 10 mg by mouth once daily.    [DISCONTINUED] amlodipine (NORVASC) 2.5 MG tablet Take 2.5 mg by mouth 2 (two) times daily.     [DISCONTINUED] furosemide (LASIX) 40 MG tablet Take 40 mg by mouth  once daily.     [DISCONTINUED] isosorbide mononitrate (IMDUR) 60 MG 24 hr tablet Take 60 mg by mouth once daily.     [DISCONTINUED] lisinopril 10 MG tablet Take 10 mg by mouth 2 (two) times daily.     [DISCONTINUED] potassium chloride (MICRO-K) 10 MEQ CpSR Take 10 mEq by mouth once daily.      Family History     Problem Relation (Age of Onset)    Heart disease Father    No Known Problems Sister, Brother, Maternal Grandmother, Maternal Grandfather, Paternal Grandmother, Paternal Grandfather, Brother, Maternal Aunt, Maternal Uncle, Paternal Aunt, Paternal Uncle    Stroke Mother        Social History Main Topics    Smoking status: Never Smoker    Smokeless tobacco: Never Used    Alcohol use No    Drug use: No    Sexual activity: Not on file     Review of Systems   Constitutional: Negative for chills and fever.   HENT: Negative for sneezing and sore throat.    Eyes: Negative for pain and visual disturbance.   Respiratory: Negative for cough, shortness of breath and wheezing.    Cardiovascular: Negative for chest pain, palpitations and leg swelling.   Gastrointestinal: Negative for abdominal pain, constipation, diarrhea and nausea.   Genitourinary: Negative for dysuria, frequency and urgency.   Allergic/Immunologic: Negative for environmental allergies.   Neurological: Negative for dizziness, light-headedness, numbness and headaches.   Psychiatric/Behavioral: Negative for agitation. The patient is not nervous/anxious.      Objective:     Vital Signs (Most Recent):  Temp: 98.1 °F (36.7 °C) (05/26/17 0803)  Pulse: 91 (05/26/17 0803)  Resp: 18 (05/26/17 0803)  BP: (!) 141/69 (05/26/17 0803)  SpO2: 97 % (05/26/17 0803) Vital Signs (24h Range):  Temp:  [96.5 °F (35.8 °C)-98.9 °F (37.2 °C)] 98.1 °F (36.7 °C)  Pulse:  [] 91  Resp:  [16-20] 18  SpO2:  [96 %-100 %] 97 %  BP: (100-141)/(63-79) 141/69     Weight: 61.2 kg (135 lb)  Body mass index is 26.37 kg/m².    Physical Exam   Constitutional: She is oriented to  person, place, and time. She appears well-developed and well-nourished. No distress.   HENT:   Head: Normocephalic and atraumatic.   Nose: Nose normal.   Eyes: EOM are normal. No scleral icterus.   Neck: Normal range of motion. No tracheal deviation present.   Cardiovascular: Normal rate.  Exam reveals no gallop and no friction rub.    No murmur heard.  Irregular rhythm   Pulmonary/Chest: Effort normal. No respiratory distress. She has no wheezes.   Crackles audible in the middle and lower R lung field as well as lower L lung field   Abdominal: Soft. Bowel sounds are normal. There is no tenderness.   Musculoskeletal: She exhibits no edema.   Neurological: She is alert and oriented to person, place, and time.   Skin: Skin is warm and dry.        Significant Labs:   CBC:     Recent Labs  Lab 05/25/17 0828 05/26/17 0456   WBC 11.53 7.69   HGB 10.2* 10.0*   HCT 30.8* 31.1*    174     CMP:     Recent Labs  Lab 05/25/17  0828 05/26/17  0456    143   K 4.2 4.4    112*   CO2 20* 23   GLU 98 85   BUN 40* 21   CREATININE 1.2 0.7   CALCIUM 8.0* 8.9   PROT 6.5 6.3   ALBUMIN 3.4* 3.3*   BILITOT 0.5 0.4   ALKPHOS 92 91   AST 22 25   ALT 6* 8*   ANIONGAP 9 8   EGFRNONAA 42.8* >60.0       Significant Imaging: I have reviewed all pertinent imaging results/findings within the past 24 hours.

## 2017-05-26 NOTE — HOSPITAL COURSE
Her hypotension was deemed to be due to a combination of antihypertensives, diuretics, and possibly poor oral intake.  Her antihypertensives and diuretics were thus held.  Her hypotension may have contributed to her YOLANDA, although an FeUrea was suggestive of an intrinsic cause. Nonetheless her YOLANDA resolved with IVFs.  In terms of her atrial tachycardia, interrogation revealed possible aflutter with tachycardia, mostly regular.  She was kept on metoprolol for rate control.  She will follow up with cardiology on 6/6. She may need an ablation given she had recurrent/symptomatic AT.  She was discharged on 5/26 after it was proven she could maintain her pressures with resumption of low dose lisinopril.

## 2017-05-26 NOTE — MEDICAL/APP STUDENT
Progress Note  Hospital Medicine    Patient Name: Loreta M Damico  YOB: 1935    Admit Date: 5/25/2017                     LOS: 1    SUBJECTIVE:     Reason for Admission:  Dehydration    HPI: 82 yo F PMH of HTN, HLD, severe AS s/p TAVR, CAD s/p CABG and ostial RCA stent, carotid disease s/p stenting of right ICA, subdural hematoma, sinus pause detected with ILR s/p dual chamber PPM placement on 5/23/17 and atrial tachycardia detected on ILR. She was discharged from the hospital yesterday after receiving her PPM the day before.  Since then she felt weak and tired when she went home. Her heart rate was in the 130s and BP was as low as 60/40 when checked with the home BP cuff. She was prescribed metoprolol on discharge for her tachycardia. However, because of a problem with the prescription, she could not take the metoprolol. However she continued with the other meds. She did not feel better this morning. So they came to the ED.  She denies any SOB, PND, orthopnea, leg edema, bleeding, fever, chills, cough, dysuria, abdominal pain, chest pain, near syncope or syncope. She reports feeling some lightheadedness and palpitations. No focal neurological deficits.  On presentation to the ED, her SBP was 85 and improved to 105 with 1 liter of normal saline. She reports feeling better compared to the time she came in.    Interval history: Pt seen this AM lying in bed. She did well overnight with no acute events. Pt denies palpitations or weakness. She has been able to get up and use the bathroom.      OBJECTIVE:     Vital Signs Range (Last 24H):  Temp:  [96.5 °F (35.8 °C)-98.9 °F (37.2 °C)]   Pulse:  []   Resp:  [16-21]   BP: (100-141)/(56-79)   SpO2:  [96 %-100 %] Body mass index is 26.37 kg/m².  Wt Readings from Last 1 Encounters:   05/26/17 0400 61.2 kg (135 lb)   05/25/17 0808 61.2 kg (135 lb)       I & O (Last 24H):No intake or output data in the 24 hours ending 05/26/17 0923    Physical  Exam:  Constitutional: She is oriented to person, place, and time. She appears well-developed and well-nourished. No distress.   HENT:   Head: Normocephalic and atraumatic.   Nose: Nose normal.   Eyes: EOM are normal. No scleral icterus.   Neck: Normal range of motion. No tracheal deviation present.   Cardiovascular: Normal rate.  Exam reveals no gallop and no friction rub.    No murmur heard.  Irregular rhythm   Pulmonary/Chest: Effort normal. No respiratory distress. She has no wheezes. Did not appreciate crackles on auscultation.  Abdominal: Soft. Bowel sounds are normal. There is no tenderness.   Musculoskeletal: She exhibits no edema.   Neurological: She is alert and oriented to person, place, and time.   Skin: Skin is warm and dry.     Diagnostic Results:  Lab Results   Component Value Date    WBC 7.69 05/26/2017    HGB 10.0 (L) 05/26/2017    HCT 31.1 (L) 05/26/2017    MCV 71 (L) 05/26/2017     05/26/2017       Recent Labs  Lab 05/26/17  0456   GLU 85      K 4.4   *   CO2 23   BUN 21   CREATININE 0.7   CALCIUM 8.9   MG 2.2     Lab Results   Component Value Date    INR 1.0 05/25/2017    INR 1.0 05/22/2017    INR 1.0 05/13/2015     ASSESSMENT/PLAN:     Active Hospital Problems    Diagnosis  POA    *Dehydration [E86.0]  Yes    Afib [I48.91]  Yes    Coronary artery disease involving autologous vein bypass graft [I25.810]  Yes    YOLANDA (acute kidney injury) [N17.9]  Yes    Hypotension [I95.9]  Unknown    Sick sinus syndrome [I49.5]  Yes    Atrial tachycardia [I47.1]  Yes    S/P TAVR (transcatheter aortic valve replacement) [Z95.2]  Not Applicable    Acute on chronic diastolic heart failure [I50.33]  Yes    Chronic diastolic heart failure [I50.32]  Yes    Aortic stenosis [I35.0]  Yes    Hypercholesterolemia [E78.00]  Yes    CAD (coronary artery disease) [I25.10]  Yes      Resolved Hospital Problems    Diagnosis Date Resolved POA   No resolved problems to display.     Problems Addressed  Today:    Hypotension  -Likely due to dehydration and BP meds / lasix and poor oral intake nena-operatively.  -In the setting of severe AS s/p TAVR, pt is pre-load dependent and may lose CO due to A fib with RVR loss of atrial kick.  -As a result of hypotension, labs reveal YOLANDA, likely pre-renal  -Bedside echo by cardiology demonstrated no pericardial effusion, small IVC  -BP improved with IVF, s/p 1L NS.    -Hold home BP meds temporarily: Amlodipine, lisinopril and lasix.  -SBP >130. Resume Lisinopril 10mg    YOLANDA (acute kidney injury)    -Seen on admit labs. Now appears resolved  -Likely related to dehydration and hypotension +/- medications  -FeUrea suggested intrinsic YOLANDA. May have some baseline intrinsic dysfunction but improvement with fluids and halting BP meds suggestive of prerenal cause  -Strict I/O  -Avoid nephrotoxic agents  -Started fluid infusion 5/25    Atrial tachycardia  --S/p pacemaker placement 5/23 for sick sinus syndrome and atrial tachycardia   --Continue Keflex 500mg TID for 3 more days  --Evaluated by Cards in the ED:  --ECG suggestive of atrial flutter.  --PPM interrogation demonstrates possible atrial flutter with tachycardia cycle length around 220 ms. Mostly regular.   --CHADS-VASC score 4. Has history of subdural hematoma. Won't initiate anticoagulation for now.  --metoprolol 25 mg BID for rate control. Can uptitrate the dose based on heart rate and BP.  --Continue with propafenone for now.  --Patient may need ablation down the road given she had recurrent/symptomatic AT    CAD (coronary artery disease)  -EKG reveals no ST changes  -Continue home ASA, plavix and statin  -Holding isosorbide for hypotension.  Can resume 5/26 per cards recs.      Sharif Bae, MS3  -[facility]

## 2017-05-26 NOTE — PLAN OF CARE
Problem: Occupational Therapy Goal  Goal: Occupational Therapy Goal  No goals at this time.  Recommended that pt be d/c from acute OT services.    Delores Acevedo, OTS

## 2017-05-26 NOTE — PT/OT/SLP EVAL
Occupational Therapy  Evaluation/Discharge    Loreta M Damico   MRN: [id number]   Admitting Diagnosis: Atrial tachycardia    OT Date of Treatment: 05/26/17   OT Start Time: 1005  OT Stop Time: 1023  OT Total Time (min): 18 min    Billable Minutes:  Evaluation 18 mins    Diagnosis: Atrial tachycardia   PMH of HTN, HLD, severe AS s/p TAVR, CAD s/p CABG and ostial RCA stent, carotid disease s/p stenting of right ICA, subdural hematoma, sinus pause detected with ILR s/p dual chamber PPM placement on 5/23/17 and atrial tachycardia detected on ILR.     Past Medical History:   Diagnosis Date    Afib 5/25/2017    Anticoagulant long-term use     Arthritis     Carotid artery occlusion     CHF (congestive heart failure)     Coronary artery disease     Encounter for blood transfusion     Hyperlipidemia     Hypertension     PONV (postoperative nausea and vomiting)     Thyroid disease       Past Surgical History:   Procedure Laterality Date    CARDIAC CATHETERIZATION      has had stents placed    CARDIAC VALVE SURGERY      CHOLECYSTECTOMY      CORONARY ANGIOPLASTY      CORONARY ARTERY BYPASS GRAFT      EYE SURGERY      HYSTERECTOMY      loop recorder placement          Referring physician: Rea Hewitt MD  Date referred to OT: 5/25/17    General Precautions: Standard, fall  Orthopedic Precautions: N/A  Braces: UE Sling    Patient History:  Living Environment  Lives With: alone  Living Arrangements: house ([facility])  Transportation Available: family or friend will provide  Living Environment Comment: Pt lives alone in a SSH with no steps entering the house. She utilizes a step in shower with no grab bars or bench to sit to shower. She states that her daughter lives two minutes away from her and assists we needed. Pt appears to have ample support from family upon d/c.  Equipment Currently Used at Home: cane, quad, cane, straight, rollator    Prior level of function:   Bed Mobility/Transfers:  independent  Grooming: independent  Bathing: independent  Upper Body Dressing: independent  Lower Body Dressing: independent  Toileting: independent  Home Management Skills: independent  Driving License: No  Mode of Transportation: Family, Friends     Dominant hand: right    Subjective:  Communicated with RN prior to session.  Pt agreed to participate in OT session today.  Chief Complaint: none  Patient/Family stated goals: Return home.    Pain/Comfort  Pain Rating 1: 0/10    Objective:  Patient found with:  (shoulder sling and swath on LUE)    Cognitive Exam:  Oriented to: Person, Place, Time and Situation  Follows Commands/attention: Follows multistep  commands  Communication: clear/fluent  Memory:  No Deficits noted  Safety awareness/insight to disability: intact  Coping skills/emotional control: Appropriate to situation    Physical Exam:  Postural examination/scapula alignment: Rounded shoulder and Head forward  Skin integrity: Visible skin intact  Edema: None noted     Sensation:   Intact    Upper Extremity Range of Motion:  Right Upper Extremity: Not formally assessed 2/2 pacemaker precautions  Left Upper Extremity: Not formally assessed 2/2 pacemaker precautions    Upper Extremity Strength:  Right Upper Extremity: Not formally assessed 2/2 pacemaker precautions  Left Upper Extremity: Not formally assessed 2/2 pacemaker precautions   Strength: Not formally assessed 2/2 pacemaker precautions but appears to be WNL as evidenced by her being able to  cane    Functional Mobility:  Bed Mobility:  Supine to Sit: Supervision (with HOB raised)    Transfers:  Sit <> Stand Assistance: Supervision  Sit <> Stand Assistive Device: No Assistive Device    Functional Ambulation: Pt was able to ambulate out of her room and down the borja with PT Sadaf. She needed supervision assist and utilized a standard cane to ambulate.    Activities of Daily Living:  UE Dressing Level of Assistance: Set-up Assistance (was able to  "geovanni gown on RUE, and draped gown on LUE)  Grooming Position: Standing at sink (washed face)  Grooming Level of Assistance: Supervision    Therapeutic Activities and Exercises:  Pt was able to perform supine to sitting EOB with supervision assistance. While sitting EOB, she donned gown like a robe with set-up A for RUE and draped gown over LUE. She then performed sit to stand transfer with supervision and ambulated out of room and until borja and back with a standard cane and supervision assistance from PT Sadaf. Pt then performed grooming activity while standing at sink and needed supervision assistance. Pt was able to ambulate back to room from bathroom and sit in bedside chair with supervision assistance. Pt was educated on role of OT/PT, pacemaker precautions and was given a handout for pacemaker precautions. Pt verbalized understanding of education presented.    AM-PAC 6 CLICK ADL  How much help from another person does this patient currently need?  1 = Unable, Total/Dependent Assistance  2 = A lot, Maximum/Moderate Assistance  3 = A little, Minimum/Contact Guard/Supervision  4 = None, Modified [place]/Independent    Putting on and taking off regular lower body clothing? : 3  Bathing (including washing, rinsing, drying)?: 3  Toileting, which includes using toilet, bedpan, or urinal? : 4  Putting on and taking off regular upper body clothing?: 3  Taking care of personal grooming such as brushing teeth?: 4  Eating meals?: 4  Total Score: 21    AM-PAC Raw Score CMS "G-Code Modifier Level of Impairment Assistance   6 % Total / Unable   7 - 9 CM 80 - 100% Maximal Assist   10-14 CL 60 - 80% Moderate Assist   15 - 19 CK 40 - 60% Moderate Assist   20 - 22 CJ 20 - 40% Minimal Assist   23 CI 1-20% SBA / CGA   24 CH 0% Independent/ Mod I       Patient left up in chair with call button in reach    Assessment:  Loreta M Damico is a 81 y.o. female with a medical diagnosis of Atrial tachycardia and presents with " decreased upper extremity function and impaired self care skills 2/2 to pacemaker placement and LUE being in sling. Upon evaluation by OT/PT, pt appears to be safe with transfers and ambulation, as well as, able to perform self-care activities with her RUE while LUE is in sling. She appears to have ample family support upon d/c to assist her if needed in self-care activities. Pt also appears to be at baseline with utilization of RUE, transfers, and ambulation so it is not recommended to continue with OT acute services. Although it is recommended that she get a shower chair upon d/c to be safe when performing bathing activities independently.     Rehab identified problem list/impairments: Rehab identified problem list/impairments: impaired self care skills, decreased upper extremity function    Activity tolerance: Excellent    Discharge recommendations: Discharge Facility/Level Of Care Needs: home with home health     Barriers to discharge: Barriers to Discharge: None    Equipment recommendations: shower chair     PLAN:  Recommended that skilled OT acute services are not needed at this time due to pt appearing to be at baseline with utilization of RUE to independently perform ADLs/ IADLs, transfers, and ambulation.        Delores Acevedo, FRANCIA  05/26/2017

## 2017-05-26 NOTE — PLAN OF CARE
CM notified [facility] accepted pt with CM calling and notifying floor RN, Tisha, pt is setup and ready for discharge once her ride is here to pick her up.

## 2017-05-26 NOTE — PLAN OF CARE
CM notified of pt to discharge today to home per Dr. Mcdowell stating pt to go home with home health.  CM notified Dr. Asia Davila of placing HH orders.  CM verified with pt to send referral to [facility].  Pt states her daughter will pick her up from hospital after 5pm.

## 2017-06-06 ENCOUNTER — CLINICAL SUPPORT (OUTPATIENT)
Dept: ELECTROPHYSIOLOGY | Facility: CLINIC | Age: 82
End: 2017-06-06
Payer: MEDICARE

## 2017-06-06 DIAGNOSIS — I49.5 SSS (SICK SINUS SYNDROME): ICD-10-CM

## 2017-06-06 DIAGNOSIS — Z95.0 CARDIAC PACEMAKER IN SITU: ICD-10-CM

## 2017-06-06 PROCEDURE — 93280 PM DEVICE PROGR EVAL DUAL: CPT | Mod: S$GLB,,, | Performed by: INTERNAL MEDICINE

## 2017-06-13 NOTE — PT/OT/SLP DISCHARGE
Physical Therapy Discharge Summary    Loreta M Damico  MRN: [id number]   Atrial tachycardia   Patient Discharged from acute Physical Therapy on 2017.  Please refer to prior PT noted date on 2017 for functional status.     Assessment:   Patient appropriate for care in another setting.  GOALS:    Physical Therapy Goals        Problem: Physical Therapy Goal    Goal Priority Disciplines Outcome Goal Variances Interventions   Physical Therapy Goal     PT/OT, PT Ongoing (interventions implemented as appropriate)     Description:  Goals to be met by: 2017     Patient will increase functional independence with mobility by performin. Supine to sit with Modified [place]  2. Sit to supine with Modified [place]  3. Sit to stand transfer with Modified [place]  4. Gait  x 200 feet with Modified [place] using Single-point Cane .   5. Lower extremity exercise program x15 reps per handout, with independence                    Reasons for Discontinuation of Therapy Services  Transfer to alternate level of care.      Plan:  Patient Discharged to: Home with Home Health Service.    HUEY JETER, PT  2017

## 2018-03-21 DIAGNOSIS — Z00.6 EXAMINATION OF PARTICIPANT IN CLINICAL TRIAL: ICD-10-CM

## 2018-03-21 DIAGNOSIS — Z95.2 S/P TAVR (TRANSCATHETER AORTIC VALVE REPLACEMENT): Primary | ICD-10-CM

## 2018-05-29 ENCOUNTER — HOSPITAL ENCOUNTER (OUTPATIENT)
Dept: CARDIOLOGY | Facility: CLINIC | Age: 83
Discharge: HOME | End: 2018-05-29
Payer: MEDICARE

## 2018-05-29 ENCOUNTER — OFFICE VISIT (OUTPATIENT)
Dept: CARDIOLOGY | Facility: CLINIC | Age: 83
End: 2018-05-29
Payer: MEDICARE

## 2018-05-29 ENCOUNTER — HOSPITAL ENCOUNTER (OUTPATIENT)
Dept: RADIOLOGY | Facility: HOSPITAL | Age: 83
Discharge: HOME | End: 2018-05-29
Attending: INTERNAL MEDICINE
Payer: MEDICARE

## 2018-05-29 VITALS
HEIGHT: 59 IN | HEART RATE: 84 BPM | DIASTOLIC BLOOD PRESSURE: 73 MMHG | SYSTOLIC BLOOD PRESSURE: 148 MMHG | BODY MASS INDEX: 28.05 KG/M2 | OXYGEN SATURATION: 97 % | WEIGHT: 139.13 LBS

## 2018-05-29 DIAGNOSIS — Z00.6 EXAMINATION OF PARTICIPANT IN CLINICAL TRIAL: ICD-10-CM

## 2018-05-29 DIAGNOSIS — Z95.2 S/P TAVR (TRANSCATHETER AORTIC VALVE REPLACEMENT): ICD-10-CM

## 2018-05-29 DIAGNOSIS — I50.32 CHRONIC DIASTOLIC HEART FAILURE: ICD-10-CM

## 2018-05-29 DIAGNOSIS — I65.23 BILATERAL CAROTID ARTERY STENOSIS: ICD-10-CM

## 2018-05-29 DIAGNOSIS — I25.10 CORONARY ARTERY DISEASE INVOLVING NATIVE CORONARY ARTERY WITHOUT ANGINA PECTORIS, UNSPECIFIED WHETHER NATIVE OR TRANSPLANTED HEART: ICD-10-CM

## 2018-05-29 DIAGNOSIS — I49.5 SICK SINUS SYNDROME: ICD-10-CM

## 2018-05-29 DIAGNOSIS — I10 ESSENTIAL HYPERTENSION: ICD-10-CM

## 2018-05-29 DIAGNOSIS — I35.0 NONRHEUMATIC AORTIC VALVE STENOSIS: Primary | ICD-10-CM

## 2018-05-29 DIAGNOSIS — E78.00 HYPERCHOLESTEROLEMIA: ICD-10-CM

## 2018-05-29 LAB
DIASTOLIC DYSFUNCTION: YES
ESTIMATED PA SYSTOLIC PRESSURE: 24.34
MITRAL VALVE REGURGITATION: ABNORMAL
RETIRED EF AND QEF - SEE NOTES: 60 (ref 55–65)
TRICUSPID VALVE REGURGITATION: ABNORMAL

## 2018-05-29 PROCEDURE — 71046 X-RAY EXAM CHEST 2 VIEWS: CPT | Mod: 26,,, | Performed by: RADIOLOGY

## 2018-05-29 PROCEDURE — 99214 OFFICE O/P EST MOD 30 MIN: CPT | Mod: S$GLB,,, | Performed by: INTERNAL MEDICINE

## 2018-05-29 PROCEDURE — 3077F SYST BP >= 140 MM HG: CPT | Mod: CPTII,S$GLB,, | Performed by: INTERNAL MEDICINE

## 2018-05-29 PROCEDURE — 3078F DIAST BP <80 MM HG: CPT | Mod: CPTII,S$GLB,, | Performed by: INTERNAL MEDICINE

## 2018-05-29 PROCEDURE — 99999 PR PBB SHADOW E&M-EST. PATIENT-LVL III: CPT | Mod: PBBFAC,,,

## 2018-05-29 PROCEDURE — 93306 TTE W/DOPPLER COMPLETE: CPT | Mod: S$GLB,,, | Performed by: INTERNAL MEDICINE

## 2018-05-29 PROCEDURE — 71046 X-RAY EXAM CHEST 2 VIEWS: CPT | Mod: TC,FY

## 2018-05-29 RX ORDER — POTASSIUM CHLORIDE 750 MG/1
10 TABLET, EXTENDED RELEASE ORAL ONCE
COMMUNITY

## 2018-05-29 NOTE — PROGRESS NOTES
"Subjective:    Patient ID:  Loreta M Damico is a 80 y.o. female who presents for 3 yr follow-up s/p TAVR    HPI  Ms Damico presents today for 3 year f/u s/p 20mm Chris S3 TAVR via TF access. She is without cardiovascular complaints. She denies CP, PND, orthopnea, or LE edema. Her only complaint is chronic hip pain. She ambulates with her walker. She has since has placement PPM due to SSS by Dr. Church. She continues to follow with Dr. Kelly and Dr. Jon.     NYHA Class II, CCS Class I    Review of Systems   Constitution: Negative for chills, diaphoresis, fever, weakness, weight gain and weight loss.   HENT: Negative for headaches and sore throat.    Eyes: Negative for blurred vision, left eye, right eye and visual disturbance.   Cardiovascular: Negative for chest pain, claudication, dyspnea on exertion, leg swelling, near-syncope, orthopnea, palpitations, paroxysmal nocturnal dyspnea and syncope.   Respiratory: Negative for cough, hemoptysis, shortness of breath, sputum production and wheezing.    Endocrine: Negative for cold intolerance and heat intolerance.   Hematologic/Lymphatic: Negative for adenopathy. Does not bruise/bleed easily.   Skin: Negative for rash.   Musculoskeletal: Negative for falls, muscle weakness and myalgias, hip and sciatic pain    Gastrointestinal: Negative for abdominal pain, change in bowel habit, constipation, diarrhea, melena and nausea.   Genitourinary: Negative for bladder incontinence.   Neurological: Negative for dizziness, focal weakness, light-headedness and numbness.   Psychiatric/Behavioral: Negative for altered mental status.         Vitals:    05/29/18 1351 05/29/18 1354   BP: (!) 159/75 (!) 148/73   BP Location: Left arm Right arm   Patient Position: Sitting Sitting   BP Method: Large (Automatic) Large (Automatic)   Pulse: 84    SpO2: 97%    Weight: 63.1 kg (139 lb 1.8 oz)    Height: 4' 11" (1.499 m)    Body mass index is 28.1 kg/m².    Objective:    Physical Exam "   Constitutional: She is oriented to person, place, and time. She appears well-developed and well-nourished. No distress.   HENT:   Head: Normocephalic and atraumatic.   Mouth/Throat: Oropharynx is clear and moist.   Eyes: Conjunctivae and EOM are normal. Pupils are equal, round, and reactive to light. No scleral icterus.   Neck: Neck supple. No JVD present. No tracheal deviation present.   Cardiovascular: Normal rate and regular rhythm.  Exam reveals no gallop and no friction rub.    No murmur heard.  Pulmonary/Chest: Effort normal and breath sounds normal. No respiratory distress. She has no wheezes. She has no rales. She exhibits no tenderness.   Abdominal: Soft. Bowel sounds are normal. She exhibits no distension. There is no hepatosplenomegaly. There is no tenderness.   Musculoskeletal: She exhibits no edema or tenderness.   Neurological: She is alert and oriented to person, place, and time.   Skin: Skin is warm and dry. No rash noted. No erythema.   Psychiatric: She has a normal mood and affect. Her behavior is normal.     Echo 5/29/2018:    CONCLUSIONS     1 - Normal left ventricular systolic function (EF 60-65%).     2 - No wall motion abnormalities.     3 - Severe left atrial enlargement.     4 - Impaired LV relaxation, elevated LAP (grade 2 diastolic dysfunction).     5 - S/P transcatheter AVR, JONY = 1.66 cm2, AVAi = 1.05 cm2/m2, mean gradient = 8 mmHg.     6 - Normal right ventricular systolic function .     7 - The estimated PA systolic pressure is 24 mmHg.       Assessment:       1. Aortic stenosis - s/p 20mm Chris S3 TAVR via TF access. Doing well. On ASA and Plavix.    2. Carotid artery stenosis, bilateral - asymptomatic. JR 60-79%, LICA non-obstructive   3. Coronary artery disease involving native coronary artery without angina pectoris - s/p CABGx2 (10y ago), s/p ostial RCA POBA on 4/2/15, patent HENSLEY to LAD,  of SVG to RCA, LI in LCx   4. Hypercholesterolemia - stable, on statin   5.  Essential hypertension - controlled   6. Examination of participant in clinical trial    7. S/P TAVR (transcatheter aortic valve replacement)    8.  9. Chronic diastolic heart failure - well compensated clinically at this time.   SSS s/p PPM- Follows with Dr. Jon      Plan:     F/U in 1 year per PARTNER protocol  Continue ASA indefinitely and also on Plavix   SBEP for life  F/u with Dr. Jon and Dr. Kelly as scheduled                     ROS  Physical Exam

## 2019-07-05 DIAGNOSIS — Z95.2 S/P TAVR (TRANSCATHETER AORTIC VALVE REPLACEMENT): Primary | ICD-10-CM

## 2019-07-05 DIAGNOSIS — Z00.6 EXAMINATION OF PARTICIPANT IN CLINICAL TRIAL: ICD-10-CM

## 2020-04-13 DIAGNOSIS — Z00.6 EXAMINATION OF PARTICIPANT IN CLINICAL TRIAL: ICD-10-CM

## 2020-04-13 DIAGNOSIS — Z95.2 S/P TAVR (TRANSCATHETER AORTIC VALVE REPLACEMENT): Primary | ICD-10-CM

## 2020-06-09 ENCOUNTER — RESEARCH ENCOUNTER (OUTPATIENT)
Dept: RESEARCH | Facility: HOSPITAL | Age: 85
End: 2020-06-09

## 2020-06-09 ENCOUNTER — OFFICE VISIT (OUTPATIENT)
Dept: CARDIOLOGY | Facility: CLINIC | Age: 85
End: 2020-06-09
Payer: MEDICARE

## 2020-06-09 ENCOUNTER — HOSPITAL ENCOUNTER (OUTPATIENT)
Dept: RADIOLOGY | Facility: HOSPITAL | Age: 85
Discharge: HOME | End: 2020-06-09
Attending: INTERNAL MEDICINE
Payer: MEDICARE

## 2020-06-09 ENCOUNTER — HOSPITAL ENCOUNTER (OUTPATIENT)
Dept: CARDIOLOGY | Facility: HOSPITAL | Age: 85
Discharge: HOME | End: 2020-06-09
Attending: INTERNAL MEDICINE
Payer: MEDICARE

## 2020-06-09 VITALS
HEIGHT: 59 IN | HEIGHT: 59 IN | SYSTOLIC BLOOD PRESSURE: 120 MMHG | BODY MASS INDEX: 28.02 KG/M2 | HEART RATE: 63 BPM | DIASTOLIC BLOOD PRESSURE: 81 MMHG | BODY MASS INDEX: 25.34 KG/M2 | WEIGHT: 139 LBS | HEART RATE: 70 BPM | WEIGHT: 125.69 LBS | DIASTOLIC BLOOD PRESSURE: 60 MMHG | SYSTOLIC BLOOD PRESSURE: 181 MMHG

## 2020-06-09 DIAGNOSIS — I25.10 ATHEROSCLEROSIS OF CORONARY ARTERY, ANGINA PRESENCE UNSPECIFIED, UNSPECIFIED VESSEL OR LESION TYPE, UNSPECIFIED WHETHER NATIVE OR TRANSPLANTED HEART: ICD-10-CM

## 2020-06-09 DIAGNOSIS — Z95.2 S/P TAVR (TRANSCATHETER AORTIC VALVE REPLACEMENT): ICD-10-CM

## 2020-06-09 DIAGNOSIS — Z00.6 EXAMINATION OF PARTICIPANT IN CLINICAL TRIAL: ICD-10-CM

## 2020-06-09 DIAGNOSIS — I48.91 ATRIAL FIBRILLATION, UNSPECIFIED TYPE: ICD-10-CM

## 2020-06-09 LAB
ASCENDING AORTA: 3.78 CM
AV INDEX (PROSTH): 0.72
AV MEAN GRADIENT: 7 MMHG
AV PEAK GRADIENT: 15 MMHG
AV VALVE AREA: 2.01 CM2
AV VELOCITY RATIO: 0.64
BSA FOR ECHO PROCEDURE: 1.62 M2
CV ECHO LV RWT: 0.41 CM
DOP CALC AO PEAK VEL: 1.95 M/S
DOP CALC AO VTI: 47.06 CM
DOP CALC LVOT AREA: 2.8 CM2
DOP CALC LVOT DIAMETER: 1.89 CM
DOP CALC LVOT PEAK VEL: 1.25 M/S
DOP CALC LVOT STROKE VOLUME: 94.47 CM3
DOP CALCLVOT PEAK VEL VTI: 33.69 CM
E WAVE DECELERATION TIME: 208.95 MSEC
E/A RATIO: 1.71
E/E' RATIO: 16.67 M/S
ECHO LV POSTERIOR WALL: 0.87 CM (ref 0.6–1.1)
FRACTIONAL SHORTENING: 27 % (ref 28–44)
INTERVENTRICULAR SEPTUM: 0.84 CM (ref 0.6–1.1)
IVRT: 108.47 MSEC
LA MAJOR: 5.97 CM
LA MINOR: 6.15 CM
LA WIDTH: 4.48 CM
LEFT ATRIUM SIZE: 5.7 CM
LEFT ATRIUM VOLUME INDEX: 83.2 ML/M2
LEFT ATRIUM VOLUME: 131.51 CM3
LEFT INTERNAL DIMENSION IN SYSTOLE: 3.09 CM (ref 2.1–4)
LEFT VENTRICLE DIASTOLIC VOLUME INDEX: 50.44 ML/M2
LEFT VENTRICLE DIASTOLIC VOLUME: 79.68 ML
LEFT VENTRICLE MASS INDEX: 71 G/M2
LEFT VENTRICLE SYSTOLIC VOLUME INDEX: 23.7 ML/M2
LEFT VENTRICLE SYSTOLIC VOLUME: 37.48 ML
LEFT VENTRICULAR INTERNAL DIMENSION IN DIASTOLE: 4.22 CM (ref 3.5–6)
LEFT VENTRICULAR MASS: 111.57 G
LV LATERAL E/E' RATIO: 13.89 M/S
LV SEPTAL E/E' RATIO: 20.83 M/S
MV PEAK A VEL: 0.73 M/S
MV PEAK E VEL: 1.25 M/S
PISA TR MAX VEL: 2.77 M/S
PULM VEIN S/D RATIO: 0.93
PV PEAK D VEL: 0.57 M/S
PV PEAK S VEL: 0.53 M/S
RA MAJOR: 4.54 CM
RA PRESSURE: 8 MMHG
RA WIDTH: 4.37 CM
RIGHT VENTRICULAR END-DIASTOLIC DIMENSION: 3.97 CM
RV TISSUE DOPPLER FREE WALL SYSTOLIC VELOCITY 1 (APICAL 4 CHAMBER VIEW): 10.26 CM/S
SINUS: 3.45 CM
STJ: 2.9 CM
TDI LATERAL: 0.09 M/S
TDI SEPTAL: 0.06 M/S
TDI: 0.08 M/S
TR MAX PG: 31 MMHG
TRICUSPID ANNULAR PLANE SYSTOLIC EXCURSION: 1.51 CM
TV REST PULMONARY ARTERY PRESSURE: 39 MMHG

## 2020-06-09 PROCEDURE — 71046 X-RAY EXAM CHEST 2 VIEWS: CPT | Mod: 26,,, | Performed by: RADIOLOGY

## 2020-06-09 PROCEDURE — 99214 PR OFFICE/OUTPT VISIT, EST, LEVL IV, 30-39 MIN: ICD-10-PCS | Mod: S$GLB,,, | Performed by: INTERNAL MEDICINE

## 2020-06-09 PROCEDURE — 3074F SYST BP LT 130 MM HG: CPT | Mod: CPTII,S$GLB,, | Performed by: INTERNAL MEDICINE

## 2020-06-09 PROCEDURE — 1159F PR MEDICATION LIST DOCUMENTED IN MEDICAL RECORD: ICD-10-PCS | Mod: S$GLB,,, | Performed by: INTERNAL MEDICINE

## 2020-06-09 PROCEDURE — 71046 X-RAY EXAM CHEST 2 VIEWS: CPT | Mod: TC,FY

## 2020-06-09 PROCEDURE — 3079F PR MOST RECENT DIASTOLIC BLOOD PRESSURE 80-89 MM HG: ICD-10-PCS | Mod: CPTII,S$GLB,, | Performed by: INTERNAL MEDICINE

## 2020-06-09 PROCEDURE — 99214 OFFICE O/P EST MOD 30 MIN: CPT | Mod: S$GLB,,, | Performed by: INTERNAL MEDICINE

## 2020-06-09 PROCEDURE — 93306 ECHO (CUPID ONLY): ICD-10-PCS | Mod: 26,,, | Performed by: INTERNAL MEDICINE

## 2020-06-09 PROCEDURE — 71046 XR CHEST PA AND LATERAL: ICD-10-PCS | Mod: 26,,, | Performed by: RADIOLOGY

## 2020-06-09 PROCEDURE — 1159F MED LIST DOCD IN RCRD: CPT | Mod: S$GLB,,, | Performed by: INTERNAL MEDICINE

## 2020-06-09 PROCEDURE — 93306 TTE W/DOPPLER COMPLETE: CPT | Mod: Q1

## 2020-06-09 PROCEDURE — 99999 PR PBB SHADOW E&M-EST. PATIENT-LVL III: CPT | Mod: PBBFAC,,,

## 2020-06-09 PROCEDURE — 1126F AMNT PAIN NOTED NONE PRSNT: CPT | Mod: S$GLB,,, | Performed by: INTERNAL MEDICINE

## 2020-06-09 PROCEDURE — 3074F PR MOST RECENT SYSTOLIC BLOOD PRESSURE < 130 MM HG: ICD-10-PCS | Mod: CPTII,S$GLB,, | Performed by: INTERNAL MEDICINE

## 2020-06-09 PROCEDURE — 3079F DIAST BP 80-89 MM HG: CPT | Mod: CPTII,S$GLB,, | Performed by: INTERNAL MEDICINE

## 2020-06-09 PROCEDURE — 1126F PR PAIN SEVERITY QUANTIFIED, NO PAIN PRESENT: ICD-10-PCS | Mod: S$GLB,,, | Performed by: INTERNAL MEDICINE

## 2020-06-09 PROCEDURE — 99999 PR PBB SHADOW E&M-EST. PATIENT-LVL III: ICD-10-PCS | Mod: PBBFAC,,,

## 2020-06-09 PROCEDURE — 93306 TTE W/DOPPLER COMPLETE: CPT | Mod: 26,,, | Performed by: INTERNAL MEDICINE

## 2020-06-09 NOTE — ASSESSMENT & PLAN NOTE
s/p CABGx2 (10y ago), s/p ostial RCA POBA on 4/2/15, patent HENSLEY to LAD,  of SVG to RCA, LI in LCx

## 2020-06-09 NOTE — PROGRESS NOTES
Study: Partner II S3U  Sponsor: "[employer]  Follow-up Visit: 5 Year  Date of Visit: 09 JUN 2020    All study protocol required CRFs completed: Yes    The subject is here for the research follow up visit.  She is accompanied by her daughter, Rey.  The subject's RBP is elevated today but, she did not take her diuretic this morning.  Current list of medications obtained and verified; She denies any hospitalizations, ED visits, or any other adverse events since previous study follow-up.  She denies SOB or WERNER but does endorse arthritic pain in her hips and low back which limit her physical activities.  She is under the care of pain management and is due for a follow up.   All questions answered to her satisfaction and she will contact research staff if she has any questions or concerns.  The subject and her daughter were thanked for participation in the Partner II research trial.    The following tests and procedures are related to research study:  CXR, ECHO, Labs, QOL, NIHSS, mRS

## 2020-06-09 NOTE — PROGRESS NOTES
Subjective:    Patient ID:  Loreta M Damico is a 84 y.o. female who presents for follow-up of TAVR.    Referring Physician: Dr. Kelly and Dr. Fan at [facility].    HPI  Ms Damico presents today for 5 year f/u s/p 20mm Chris S3 TAVR via TF access. She is without cardiovascular complaints today. Her main limitation is bilateral knee pain due to OA. She continues to walk with her walker due to the pain, but does not experience WERNER. She is currently on ASA and Eliquis. She has a history of Afib, CHADSVASC 4 (4%/yr), HASBLED 3 (3.74 %/yr). She continues to follow with Dr. Kelly (cardiologist) and Dr. Fan (EP).     NYHA: I CCS: 0    Review of Systems   Constitution: Negative for chills and fever.   HENT: Negative for sore throat.    Eyes: Negative for blurred vision.   Cardiovascular: Negative for chest pain, claudication, cyanosis, dyspnea on exertion, irregular heartbeat, leg swelling, near-syncope, orthopnea, palpitations, paroxysmal nocturnal dyspnea and syncope.   Respiratory: Negative for cough and sputum production.    Hematologic/Lymphatic: Bruises/bleeds easily.   Skin: Negative for itching, rash and suspicious lesions.   Musculoskeletal: Positive for joint pain and muscle weakness. Negative for falls.   Gastrointestinal: Negative for abdominal pain and change in bowel habit.   Genitourinary: Negative for dysuria.   Neurological: Negative for disturbances in coordination, dizziness and loss of balance.   Psychiatric/Behavioral: Negative for altered mental status.        Past Medical History:   Diagnosis Date    Afib 5/25/2017    Anticoagulant long-term use     Arthritis     Carotid artery occlusion     CHF (congestive heart failure)     Coronary artery disease     Encounter for blood transfusion     Hyperlipidemia     Hypertension     PONV (postoperative nausea and vomiting)     Thyroid disease      Current Outpatient Medications on File Prior to Visit   Medication Sig  "Dispense Refill    acetaminophen (TYLENOL) 500 MG tablet Take 1,000 mg by mouth as needed for Pain.      aspirin (ECOTRIN) 81 MG EC tablet Take 81 mg by mouth once daily.      cyanocobalamin 1,000 mcg/mL injection 1,000 mcg every 30 days.       folic acid (FOLVITE) 1 MG tablet Take 1 mg by mouth once daily.       furosemide (LASIX) 40 MG tablet Take 1 tablet (40 mg total) by mouth daily as needed. Daily as needed for 5 lbs weight gain in 48 hours 30 tablet 0    levothyroxine (SYNTHROID) 50 MCG tablet Take 50 mcg by mouth once daily.       pantoprazole (PROTONIX) 40 MG tablet Take 40 mg by mouth 2 (two) times daily.       potassium chloride (KLOR-CON) 10 MEQ TbSR Take 10 mEq by mouth once.      PROLIA 60 mg/mL Syrg Inject 60 mg as directed every 6 (six) months.       propafenone (RHTHYMOL) 150 MG Tab Take 150 mg by mouth 2 (two) times daily.      rosuvastatin (CRESTOR) 10 MG tablet Take 10 mg by mouth once daily.      clopidogrel (PLAVIX) 75 mg tablet Take 75 mg by mouth once daily.      lisinopril (PRINIVIL,ZESTRIL) 5 MG tablet Take 1 tablet (5 mg total) by mouth once daily. 30 tablet 6    metoprolol tartrate (LOPRESSOR) 25 MG tablet Take 1 tablet (25 mg total) by mouth 2 (two) times daily. 60 tablet 3     No current facility-administered medications on file prior to visit.      Vitals:    06/09/20 1336 06/09/20 1339   BP: (!) 186/86 (!) 181/81   BP Location: Left arm Right arm   Patient Position: Sitting Sitting   BP Method: Large (Automatic) Large (Automatic)   Pulse: 66 63   Weight: 57 kg (125 lb 10.6 oz)    Height: 4' 11" (1.499 m)      Body mass index is 25.38 kg/m².  Objective:    Physical Exam   Constitutional: She is oriented to person, place, and time. She appears well-developed and well-nourished. No distress.   HENT:   Head: Normocephalic and atraumatic.   Eyes: EOM are normal.   Neck: Normal range of motion. No JVD present.   Cardiovascular: Intact distal pulses.   No murmur " heard.  Pulmonary/Chest: Effort normal and breath sounds normal. No respiratory distress.   Abdominal: Soft. She exhibits no distension.   Musculoskeletal: Normal range of motion. She exhibits no edema, tenderness or deformity.   Walks with a walker   Neurological: She is alert and oriented to person, place, and time.   Skin: Skin is warm and dry. She is not diaphoretic.   Vitals reviewed.        TTE:  · Normal left ventricular systolic function. The estimated ejection fraction is 60%.  · Grade II (moderate) left ventricular diastolic dysfunction consistent with pseudonormalization.  · No wall motion abnormalities.  · Normal right ventricular systolic function.  · Biatrial enlargement.  · There is a 20 mm Benson Chris S3 transcutaneously-placed aortic bioprosthesis present. There is trivial paravalvular aortic insufficiency present.  · Mild mitral regurgitation.  · Mild tricuspid regurgitation.  · The estimated PA systolic pressure is 39 mmHg.  · Intermediate central venous pressure (8 mmHg).    Assessment:   S/P TAVR (transcatheter aortic valve replacement)  - S/p 20mm Chris S3 TAVR via TF access.    Examination of participant in clinical trial  - PARTNER 2 Trial    Coronary atherosclerosis of unspecified type of vessel, native or graft  - s/p CABGx2 (10y ago), s/p ostial RCA POBA on 4/2/15, patent HENSLEY to LAD,  of SVG to RCA, LI in LCx    Afib  - On Eliquis.  - CHADSVASC 4 (4%/yr), HASBLED 3 (3.74 %/yr).  - Follows [facility] Dr. Fan at [facility].  - She is a [employer] Candidate. Wants to talk to Dr. Fan and family before deciding.       Plan:     F/U  per PARTNER protocol  Continue ASA/Xarelto.  SBEP for life.  F/u with Dr. Jon and Dr. Kelly as scheduled.  She will call back for an appointment if she wishes to proceed with Watchman.        Brenda Basilio PA-C  Interventional Cardiology  [facility]

## 2020-06-09 NOTE — ASSESSMENT & PLAN NOTE
- On Eliquis.  - CHADSVASC 4 (4%/yr), HASBLED 3 (3.74 %/yr).  - Follows with Dr. Fan at [facility].  - She is a Watchman Candidate. Wants to talk to Dr. Fan and family before deciding.

## 2021-04-15 ENCOUNTER — PATIENT MESSAGE (OUTPATIENT)
Dept: RESEARCH | Facility: HOSPITAL | Age: 86
End: 2021-04-15